# Patient Record
Sex: FEMALE | Race: WHITE | NOT HISPANIC OR LATINO | Employment: OTHER | URBAN - METROPOLITAN AREA
[De-identification: names, ages, dates, MRNs, and addresses within clinical notes are randomized per-mention and may not be internally consistent; named-entity substitution may affect disease eponyms.]

---

## 2020-06-11 ENCOUNTER — TELEPHONE (OUTPATIENT)
Dept: CARDIOLOGY | Facility: MEDICAL CENTER | Age: 70
End: 2020-06-11

## 2020-06-11 NOTE — TELEPHONE ENCOUNTER
Called patient to see if she has followed with a cardiologist in the past to get records prior to new patient appt with [facility]. She states she was seen by Dr. Balderas in the past, faxed STAT records request to his office (F: [phone], P: [phone]). Fax confirmation received.

## 2020-06-12 ENCOUNTER — OFFICE VISIT (OUTPATIENT)
Dept: CARDIOLOGY | Facility: MEDICAL CENTER | Age: 70
End: 2020-06-12
Payer: MEDICARE

## 2020-06-12 VITALS
HEART RATE: 100 BPM | WEIGHT: 199.96 LBS | HEIGHT: 64 IN | BODY MASS INDEX: 34.14 KG/M2 | SYSTOLIC BLOOD PRESSURE: 118 MMHG | RESPIRATION RATE: 14 BRPM | DIASTOLIC BLOOD PRESSURE: 82 MMHG | OXYGEN SATURATION: 95 %

## 2020-06-12 DIAGNOSIS — R94.39 ABNORMAL NUCLEAR STRESS TEST: ICD-10-CM

## 2020-06-12 DIAGNOSIS — E78.5 HYPERLIPIDEMIA, UNSPECIFIED HYPERLIPIDEMIA TYPE: ICD-10-CM

## 2020-06-12 DIAGNOSIS — R09.89 OTHER SPECIFIED SYMPTOMS AND SIGNS INVOLVING THE CIRCULATORY AND RESPIRATORY SYSTEMS: ICD-10-CM

## 2020-06-12 DIAGNOSIS — M79.605 PAIN IN BOTH LOWER EXTREMITIES: ICD-10-CM

## 2020-06-12 DIAGNOSIS — M79.604 PAIN IN BOTH LOWER EXTREMITIES: ICD-10-CM

## 2020-06-12 DIAGNOSIS — Z72.0 TOBACCO ABUSE: ICD-10-CM

## 2020-06-12 DIAGNOSIS — R06.01 ORTHOPNEA: ICD-10-CM

## 2020-06-12 LAB — EKG IMPRESSION: NORMAL

## 2020-06-12 PROCEDURE — 93000 ELECTROCARDIOGRAM COMPLETE: CPT | Performed by: INTERNAL MEDICINE

## 2020-06-12 PROCEDURE — 99203 OFFICE O/P NEW LOW 30 MIN: CPT | Mod: 25 | Performed by: INTERNAL MEDICINE

## 2020-06-12 PROCEDURE — 99407 BEHAV CHNG SMOKING > 10 MIN: CPT | Performed by: INTERNAL MEDICINE

## 2020-06-12 RX ORDER — BUPROPION HYDROCHLORIDE 150 MG/1
TABLET, EXTENDED RELEASE ORAL
Qty: 90 TAB | Refills: 1 | Status: SHIPPED | OUTPATIENT
Start: 2020-06-12 | End: 2020-07-13

## 2020-06-12 RX ORDER — LISINOPRIL 20 MG/1
TABLET ORAL
COMMUNITY
Start: 2020-04-27 | End: 2020-06-12

## 2020-06-12 RX ORDER — CHLORTHALIDONE 25 MG/1
TABLET ORAL
COMMUNITY
Start: 2020-04-27 | End: 2021-05-25 | Stop reason: SDUPTHER

## 2020-06-12 RX ORDER — WARFARIN SODIUM 5 MG/1
TABLET ORAL
COMMUNITY
Start: 2020-04-27 | End: 2021-03-08

## 2020-06-12 RX ORDER — WARFARIN SODIUM 5 MG/1
TABLET ORAL
COMMUNITY
Start: 2020-04-27 | End: 2020-06-12

## 2020-06-12 RX ORDER — LISINOPRIL 20 MG/1
10 TABLET ORAL
COMMUNITY
Start: 2020-04-27 | End: 2021-06-07

## 2020-06-12 RX ORDER — ROSUVASTATIN CALCIUM 5 MG/1
5 TABLET, COATED ORAL
Qty: 30 TAB | Refills: 3 | Status: ON HOLD | OUTPATIENT
Start: 2020-06-12 | End: 2020-06-26 | Stop reason: SDUPTHER

## 2020-06-12 RX ORDER — CHLORTHALIDONE 25 MG/1
TABLET ORAL
COMMUNITY
Start: 2020-04-27 | End: 2020-06-12

## 2020-06-12 ASSESSMENT — ENCOUNTER SYMPTOMS
DIARRHEA: 0
COUGH: 0
DYSPNEA ON EXERTION: 0
SYNCOPE: 0
BACK PAIN: 0
IRREGULAR HEARTBEAT: 0
BLURRED VISION: 0
ABDOMINAL PAIN: 0
FLANK PAIN: 0
HEARTBURN: 0
DECREASED APPETITE: 0
CONSTIPATION: 0
PND: 0
WEIGHT LOSS: 0
VOMITING: 0
ALTERED MENTAL STATUS: 0
SHORTNESS OF BREATH: 0
DEPRESSION: 0
NAUSEA: 0
PALPITATIONS: 0
DIZZINESS: 0
CLAUDICATION: 1
NEAR-SYNCOPE: 0
FEVER: 0
ORTHOPNEA: 1
WEIGHT GAIN: 0

## 2020-06-12 NOTE — PATIENT INSTRUCTIONS
Take cholesterol medication with Co Q10.     Please attempt to quit smoking. Start wellbutrin in combination with nicotine patch (over the counter).    Rosuvastatin Tablets  What is this medicine?  ROSUVASTATIN (hima LI de jesus sta tin) is known as a HMG-CoA reductase inhibitor or 'statin'. It lowers cholesterol and triglycerides in the blood. This drug may also reduce the risk of heart attack, stroke, or other health problems in patients with risk factors for heart disease. Diet and lifestyle changes are often used with this drug.  This medicine may be used for other purposes; ask your health care provider or pharmacist if you have questions.  COMMON BRAND NAME(S): Crestor  What should I tell my health care provider before I take this medicine?  They need to know if you have any of these conditions:  -frequently drink alcoholic beverages  -kidney disease  -liver disease  -muscle aches or weakness  -other medical condition  -an unusual or allergic reaction to rosuvastatin, other medicines, foods, dyes, or preservatives  -pregnant or trying to get pregnant  -breast-feeding  How should I use this medicine?  Take this medicine by mouth with a glass of water. Follow the directions on the prescription label. Do not cut, crush or chew this medicine. You can take this medicine with or without food. Take your doses at regular intervals. Do not take your medicine more often than directed.  Talk to your pediatrician regarding the use of this medicine in children. While this drug may be prescribed for children as young as 7 years old for selected conditions, precautions do apply.  Overdosage: If you think you have taken too much of this medicine contact a poison control center or emergency room at once.  NOTE: This medicine is only for you. Do not share this medicine with others.  What if I miss a dose?  If you miss a dose, take it as soon as you can. Do not take 2 doses within 12 hours of each other. If there are less than 12  hours until your next dose, take only that dose. Do not take double or extra doses.  What may interact with this medicine?  Do not take this medicine with any of the following medications:  -herbal medicines like red yeast rice  This medicine may also interact with the following medications:  -alcohol  -antacids containing aluminum hydroxide or magnesium hydroxide  -cyclosporine  -other medicines for high cholesterol  -some medicines for HIV infection  -warfarin  This list may not describe all possible interactions. Give your health care provider a list of all the medicines, herbs, non-prescription drugs, or dietary supplements you use. Also tell them if you smoke, drink alcohol, or use illegal drugs. Some items may interact with your medicine.  What should I watch for while using this medicine?  Visit your doctor or health care professional for regular check-ups. You may need regular tests to make sure your liver is working properly.  Tell your doctor or health care professional right away if you get any unexplained muscle pain, tenderness, or weakness, especially if you also have a fever and tiredness. Your doctor or health care professional may tell you to stop taking this medicine if you develop muscle problems. If your muscle problems do not go away after stopping this medicine, contact your health care professional.  This medicine may affect blood sugar levels. If you have diabetes, check with your doctor or health care professional before you change your diet or the dose of your diabetic medicine.  Avoid taking antacids containing aluminum, calcium or magnesium within 2 hours of taking this medicine.  This drug is only part of a total heart-health program. Your doctor or a dietician can suggest a low-cholesterol and low-fat diet to help. Avoid alcohol and smoking, and keep a proper exercise schedule.  Do not use this drug if you are pregnant or breast-feeding. Serious side effects to an unborn child or to an  infant are possible. Talk to your doctor or pharmacist for more information.  What side effects may I notice from receiving this medicine?  Side effects that you should report to your doctor or health care professional as soon as possible:  -allergic reactions like skin rash, itching or hives, swelling of the face, lips, or tongue  -dark urine  -fever  -joint pain  -muscle cramps, pain  -redness, blistering, peeling or loosening of the skin, including inside the mouth  -trouble passing urine or change in the amount of urine  -unusually weak or tired  -yellowing of the eyes or skin  Side effects that usually do not require medical attention (report to your doctor or health care professional if they continue or are bothersome):  -constipation  -heartburn  -nausea  -stomach gas, pain, upset  This list may not describe all possible side effects. Call your doctor for medical advice about side effects. You may report side effects to FDA at 0-816-FDA-3297.  Where should I keep my medicine?  Keep out of the reach of children.  Store at room temperature between 20 and 25 degrees C (68 and 77 degrees F). Keep container tightly closed (protect from moisture). Throw away any unused medicine after the expiration date.  NOTE: This sheet is a summary. It may not cover all possible information. If you have questions about this medicine, talk to your doctor, pharmacist, or health care provider.  © 2018 [employer]vier/Gold Standard (2016-06-02 13:33:08)

## 2020-06-12 NOTE — PROGRESS NOTES
"Cardiology Note    Chief Complaint   Patient presents with   • Cardiomegaly     NP       History of Present Illness: Trina Naidu is a 70 y.o. female PMH active smoker, factor V leiden with hx DVT/PE admitted [facility] 2016 who presents for initial visit.    Feels \"heart spasm\" describes as tightness with tachycardia. Improvement with sitting up compared to laying flat. \"I get more short of breath laying down.\" Was given oxygen concentrator at night for orthostasis by former cardiologist Dr Birmingham. Can walk in grocery store but limited due to leg pain; not clear if typical claudication. Continues to smoke but would like to quit.       Review of Systems   Constitution: Negative for decreased appetite, fever, malaise/fatigue, weight gain and weight loss.   HENT: Negative for congestion and nosebleeds.    Eyes: Negative for blurred vision.   Cardiovascular: Positive for claudication and orthopnea. Negative for chest pain, dyspnea on exertion, irregular heartbeat, leg swelling, near-syncope, palpitations, paroxysmal nocturnal dyspnea and syncope.   Respiratory: Negative for cough and shortness of breath.    Endocrine: Negative for cold intolerance and heat intolerance.   Skin: Negative for rash.   Musculoskeletal: Negative for back pain.   Gastrointestinal: Negative for abdominal pain, constipation, diarrhea, heartburn, melena, nausea and vomiting.   Genitourinary: Negative for dysuria, flank pain and hematuria.   Neurological: Negative for dizziness.   Psychiatric/Behavioral: Negative for altered mental status and depression.         No past medical history on file.      No past surgical history on file.      Current Outpatient Medications   Medication Sig Dispense Refill   • chlorthalidone (HYGROTON) 25 MG Tab TAKE 1 TABLET BY MOUTH DAILY     • lisinopril (PRINIVIL) 20 MG Tab 10 mg. TAKE 1 TABLET BY MOUTH DAILY     • warfarin (COUMADIN) 5 MG Tab TAKE 1 TABLET BY MOUTH DAILY     • rosuvastatin (CRESTOR) 5 MG Tab Take 1 " "Tab by mouth 3 times a week. 30 Tab 3   • buPROPion SR (WELLBUTRIN SR) 150 MG TABLET SR 12 HR sustained-release tablet For three days take one tab per day and the one tab twice per day; 12 weeks. 90 Tab 1     No current facility-administered medications for this visit.          No Known Allergies      No family history on file.      Social History     Socioeconomic History   • Marital status: Single     Spouse name: Not on file   • Number of children: Not on file   • Years of education: Not on file   • Highest education level: Not on file   Occupational History   • Not on file   Social Needs   • Financial resource strain: Not on file   • Food insecurity     Worry: Not on file     Inability: Not on file   • Transportation needs     Medical: Not on file     Non-medical: Not on file   Tobacco Use   • Smoking status: Current Every Day Smoker     Types: Cigarettes   • Smokeless tobacco: Never Used   Substance and Sexual Activity   • Alcohol use: Not on file   • Drug use: Not on file   • Sexual activity: Not on file   Lifestyle   • Physical activity     Days per week: Not on file     Minutes per session: Not on file   • Stress: Not on file   Relationships   • Social connections     Talks on phone: Not on file     Gets together: Not on file     Attends [employer] service: Not on file     Active member of club or organization: Not on file     Attends meetings of clubs or organizations: Not on file     Relationship status: Not on file   • Intimate partner violence     Fear of current or ex partner: Not on file     Emotionally abused: Not on file     Physically abused: Not on file     Forced sexual activity: Not on file   Other Topics Concern   • Not on file   Social History Narrative   • Not on file         Physical Exam:  Ambulatory Vitals  /82 (BP Location: Left arm, Patient Position: Sitting, BP Cuff Size: Adult)   Pulse 100   Resp 14   Ht 1.613 m (5' 3.5\")   Wt 90.7 kg (199 lb 15.3 oz)   SpO2 95%    BP Readings " "from Last 4 Encounters:   06/12/20 118/82     Weight/BMI:   Vitals:    06/12/20 1036   BP: 118/82   Weight: 90.7 kg (199 lb 15.3 oz)   Height: 1.613 m (5' 3.5\")    Body mass index is 34.87 kg/m².  Wt Readings from Last 4 Encounters:   06/12/20 90.7 kg (199 lb 15.3 oz)       Physical Exam   Constitutional: She is oriented to person, place, and time and well-developed, well-nourished, and in no distress. No distress.   HENT:   Head: Normocephalic and atraumatic.   Eyes: Pupils are equal, round, and reactive to light. Conjunctivae are normal.   Neck: Normal range of motion. Neck supple. No JVD present.   Cardiovascular: Normal rate, regular rhythm, normal heart sounds and intact distal pulses. Exam reveals no gallop and no friction rub.   No murmur heard.  Pulmonary/Chest: Effort normal and breath sounds normal. No respiratory distress. She has no wheezes. She has no rales. She exhibits no tenderness.   Abdominal: Soft. Bowel sounds are normal. She exhibits no distension.   Musculoskeletal:         General: No edema.   Neurological: She is alert and oriented to person, place, and time.   Skin: Skin is warm and dry.   Psychiatric: Affect and judgment normal.       Lab Data Review:  No results found for: CHOLSTRLTOT, LDL, HDL, TRIGLYCERIDE    No results found for: SODIUM, POTASSIUM, CHLORIDE, CO2, GLUCOSE, BUN, CREATININE, BUNCREATRAT, GLOMRATE  CrCl cannot be calculated (No successful lab value found.).  No results found for: ALKPHOSPHAT, ASTSGOT, ALTSGPT, TBILIRUBIN   No results found for: WBC, HCT  No results found for: HBA1C  No components found for: TROP    Outside labs [facility] [place] 2017  , trig 180, HDL 36, tot chol 273    Cardiac Imaging and Procedures Review:      TTE 2/6/2018 w Dr Birmingham outside study  -normal LV size, inferior base mild hypokinesis, normal systolic function, mild concentric LVH  -mild LAE  -mild MR  -mild RV and RA enlargement and preserved RV function    Nuclear stress spect exercise " "2/6/2018 w Dr Birmingham outside study  -duration 3 min reaching MPHR 85%  -\"mild to moderate amount of partial to complete fixed defects of all segments base to apex (with some GI), lynn mild to moderate amount of partial to complete reversible ischemia of anteroseptal, septal, and inferoseptal segments at mid-ventricle and anterolateral base, with some mild partial to complete reversed redistribution of same segments consistent with some oft tissue attenuation; cannot rule out for prior event(s)\" ... ???    Medical Decision Making:  Problem List Items Addressed This Visit     Tobacco abuse    Hyperlipidemia    Relevant Medications    chlorthalidone (HYGROTON) 25 MG Tab    lisinopril (PRINIVIL) 20 MG Tab    warfarin (COUMADIN) 5 MG Tab    rosuvastatin (CRESTOR) 5 MG Tab    Other Relevant Orders    REFERRAL TO NUTRITION SERVICES    Abnormal nuclear stress test    Relevant Orders    CL-RIGHT AND LEFT HEART CATHETERIZATION    Orthopnea    Relevant Orders    CL-RIGHT AND LEFT HEART CATHETERIZATION    Pain in both lower extremities    Relevant Orders    US-EXTREMITY ARTERY LOWER BILAT W/JUAN F (COMBO)    Other specified symptoms and signs involving the circulatory and respiratory systems     Relevant Orders    US-EXTREMITY ARTERY LOWER BILAT W/JUAN F (COMBO)        Orthopnea and abnormal stress - definitive testing with R/LHC    LE pain / possibly claudication - check LE JUAN F    HTN - controlled. Continue regimen.    HLD (LDL >190) - attempt rosuvastatin 5mg three times weekly. Previously myalgia with atorvastatin.     Tobacco abuse -  Discussed for 15 min. She will attempt wellbutrin and nicotine patch to quit.    It was my pleasure to meet with Ms. Naidu.                      "

## 2020-06-17 ENCOUNTER — TELEPHONE (OUTPATIENT)
Dept: CARDIOLOGY | Facility: MEDICAL CENTER | Age: 70
End: 2020-06-17

## 2020-06-17 NOTE — TELEPHONE ENCOUNTER
"Per VR instructions from 6/12: \"Take cholesterol medication with Co Q10.\" Returned pt's call and advised that she should take at the same time as rosuvastatin.   "

## 2020-06-17 NOTE — TELEPHONE ENCOUNTER
MARÍA ELENA/roxanne    Pt calling for clarification of VR's instructions on taking her rosuvastatin along with the timing of taking her CO-Q-10.    Please call Trina

## 2020-06-18 ENCOUNTER — TELEPHONE (OUTPATIENT)
Dept: CARDIOLOGY | Facility: MEDICAL CENTER | Age: 70
End: 2020-06-18

## 2020-06-18 NOTE — TELEPHONE ENCOUNTER
Called patient 2xs on only phone number on file.  No voicemail set up.  PT needs to r/s R&LHC set up for 6/22/2020 to another day.

## 2020-06-19 ENCOUNTER — TELEPHONE (OUTPATIENT)
Dept: CARDIOLOGY | Facility: MEDICAL CENTER | Age: 70
End: 2020-06-19

## 2020-06-19 NOTE — TELEPHONE ENCOUNTER
Patient is scheduled on 6/25/2020 for a R& LHC w/poss with Dr. Saavedra.  PT to check in at 8:30am for a 10:30am procedure.  Patient instructed to stop coumadin for 5 days prior to procedure. Pt to contact [facility] who manages her coumadin. Updated H & P done by VR on 6/12/2020.  PRe-admit to call pt.

## 2020-06-24 SDOH — HEALTH STABILITY: MENTAL HEALTH: HOW OFTEN DO YOU HAVE A DRINK CONTAINING ALCOHOL?: NEVER

## 2020-06-24 NOTE — OR NURSING
COVID-19 Pre-Surgery Screenin. Do you have an undiagnosed respiratory illness or symptoms such as coughing or sneezing? no     1. Onset of Sx: na     2. Acute vs. chronic respiratory illness: na     2. Do you have an unexplained fever greater than 100.4 degrees Fahrenheit or 38 degrees Celsius? no  ?  3. Have you had direct exposure to a patient who tested positive for Covid-19? no    4. Have you traveled outside of [place] within the last 14 days? no    5. Patient informed of current visitation and mask policies by this RN.

## 2020-06-25 ENCOUNTER — HOSPITAL ENCOUNTER (OUTPATIENT)
Facility: MEDICAL CENTER | Age: 70
End: 2020-06-26
Attending: INTERNAL MEDICINE | Admitting: INTERNAL MEDICINE
Payer: MEDICARE

## 2020-06-25 ENCOUNTER — APPOINTMENT (OUTPATIENT)
Dept: CARDIOLOGY | Facility: MEDICAL CENTER | Age: 70
End: 2020-06-25
Attending: INTERNAL MEDICINE
Payer: MEDICARE

## 2020-06-25 DIAGNOSIS — R06.01 ORTHOPNEA: ICD-10-CM

## 2020-06-25 DIAGNOSIS — R94.39 ABNORMAL NUCLEAR STRESS TEST: ICD-10-CM

## 2020-06-25 DIAGNOSIS — D68.2 FACTOR V DEFICIENCY (HCC): ICD-10-CM

## 2020-06-25 LAB
25(OH)D3 SERPL-MCNC: 35 NG/ML (ref 30–100)
ALBUMIN SERPL BCP-MCNC: 3.9 G/DL (ref 3.2–4.9)
ALBUMIN/GLOB SERPL: 1.3 G/DL
ALP SERPL-CCNC: 73 U/L (ref 30–99)
ALT SERPL-CCNC: 20 U/L (ref 2–50)
ANION GAP SERPL CALC-SCNC: 10 MMOL/L (ref 7–16)
APTT PPP: 33.1 SEC (ref 24.7–36)
AST SERPL-CCNC: 15 U/L (ref 12–45)
BILIRUB SERPL-MCNC: 0.4 MG/DL (ref 0.1–1.5)
BUN SERPL-MCNC: 26 MG/DL (ref 8–22)
CALCIUM SERPL-MCNC: 10.3 MG/DL (ref 8.5–10.5)
CHLORIDE SERPL-SCNC: 98 MMOL/L (ref 96–112)
CHOLEST SERPL-MCNC: 179 MG/DL (ref 100–199)
CO2 SERPL-SCNC: 23 MMOL/L (ref 20–33)
CREAT SERPL-MCNC: 0.73 MG/DL (ref 0.5–1.4)
EKG IMPRESSION: NORMAL
EKG IMPRESSION: NORMAL
ERYTHROCYTE [DISTWIDTH] IN BLOOD BY AUTOMATED COUNT: 49.1 FL (ref 35.9–50)
EST. AVERAGE GLUCOSE BLD GHB EST-MCNC: 120 MG/DL
GLOBULIN SER CALC-MCNC: 3 G/DL (ref 1.9–3.5)
GLUCOSE SERPL-MCNC: 131 MG/DL (ref 65–99)
HBA1C MFR BLD: 5.8 % (ref 0–5.6)
HCT VFR BLD AUTO: 49.6 % (ref 37–47)
HDLC SERPL-MCNC: 42 MG/DL
HGB BLD-MCNC: 16.9 G/DL (ref 12–16)
INR PPP: 0.96 (ref 0.87–1.13)
IRON SATN MFR SERPL: 21 % (ref 15–55)
IRON SERPL-MCNC: 64 UG/DL (ref 40–170)
LDLC SERPL CALC-MCNC: 116 MG/DL
MCH RBC QN AUTO: 31.7 PG (ref 27–33)
MCHC RBC AUTO-ENTMCNC: 34.1 G/DL (ref 33.6–35)
MCV RBC AUTO: 93.1 FL (ref 81.4–97.8)
PLATELET # BLD AUTO: 190 K/UL (ref 164–446)
PMV BLD AUTO: 12.3 FL (ref 9–12.9)
POTASSIUM SERPL-SCNC: 3.7 MMOL/L (ref 3.6–5.5)
PROT SERPL-MCNC: 6.9 G/DL (ref 6–8.2)
PROTHROMBIN TIME: 13.1 SEC (ref 12–14.6)
RBC # BLD AUTO: 5.33 M/UL (ref 4.2–5.4)
SODIUM SERPL-SCNC: 131 MMOL/L (ref 135–145)
T3FREE SERPL-MCNC: 3.27 PG/ML (ref 2–4.4)
T4 FREE SERPL-MCNC: 1.27 NG/DL (ref 0.93–1.7)
TIBC SERPL-MCNC: 312 UG/DL (ref 250–450)
TRIGL SERPL-MCNC: 106 MG/DL (ref 0–149)
TSH SERPL DL<=0.005 MIU/L-ACNC: 2.23 UIU/ML (ref 0.38–5.33)
UIBC SERPL-MCNC: 248 UG/DL (ref 110–370)
WBC # BLD AUTO: 9.5 K/UL (ref 4.8–10.8)

## 2020-06-25 PROCEDURE — 84443 ASSAY THYROID STIM HORMONE: CPT

## 2020-06-25 PROCEDURE — 93005 ELECTROCARDIOGRAM TRACING: CPT | Performed by: INTERNAL MEDICINE

## 2020-06-25 PROCEDURE — 85027 COMPLETE CBC AUTOMATED: CPT

## 2020-06-25 PROCEDURE — 700105 HCHG RX REV CODE 258: Performed by: INTERNAL MEDICINE

## 2020-06-25 PROCEDURE — 700102 HCHG RX REV CODE 250 W/ 637 OVERRIDE(OP): Performed by: NURSE PRACTITIONER

## 2020-06-25 PROCEDURE — 82306 VITAMIN D 25 HYDROXY: CPT

## 2020-06-25 PROCEDURE — 84481 FREE ASSAY (FT-3): CPT

## 2020-06-25 PROCEDURE — A9270 NON-COVERED ITEM OR SERVICE: HCPCS | Performed by: NURSE PRACTITIONER

## 2020-06-25 PROCEDURE — 92928 PRQ TCAT PLMT NTRAC ST 1 LES: CPT | Mod: LC | Performed by: INTERNAL MEDICINE

## 2020-06-25 PROCEDURE — 93010 ELECTROCARDIOGRAM REPORT: CPT | Mod: 76 | Performed by: INTERNAL MEDICINE

## 2020-06-25 PROCEDURE — 700101 HCHG RX REV CODE 250

## 2020-06-25 PROCEDURE — 83550 IRON BINDING TEST: CPT

## 2020-06-25 PROCEDURE — C1887 CATHETER, GUIDING: HCPCS

## 2020-06-25 PROCEDURE — 160002 HCHG RECOVERY MINUTES (STAT)

## 2020-06-25 PROCEDURE — 700111 HCHG RX REV CODE 636 W/ 250 OVERRIDE (IP)

## 2020-06-25 PROCEDURE — 83540 ASSAY OF IRON: CPT

## 2020-06-25 PROCEDURE — 80053 COMPREHEN METABOLIC PANEL: CPT

## 2020-06-25 PROCEDURE — 99152 MOD SED SAME PHYS/QHP 5/>YRS: CPT | Performed by: INTERNAL MEDICINE

## 2020-06-25 PROCEDURE — A9270 NON-COVERED ITEM OR SERVICE: HCPCS

## 2020-06-25 PROCEDURE — 93458 L HRT ARTERY/VENTRICLE ANGIO: CPT | Mod: 26,59 | Performed by: INTERNAL MEDICINE

## 2020-06-25 PROCEDURE — 84439 ASSAY OF FREE THYROXINE: CPT

## 2020-06-25 PROCEDURE — 700102 HCHG RX REV CODE 250 W/ 637 OVERRIDE(OP)

## 2020-06-25 PROCEDURE — 80061 LIPID PANEL: CPT

## 2020-06-25 PROCEDURE — G0378 HOSPITAL OBSERVATION PER HR: HCPCS

## 2020-06-25 PROCEDURE — 93010 ELECTROCARDIOGRAM REPORT: CPT | Performed by: INTERNAL MEDICINE

## 2020-06-25 PROCEDURE — 83036 HEMOGLOBIN GLYCOSYLATED A1C: CPT

## 2020-06-25 PROCEDURE — 85610 PROTHROMBIN TIME: CPT

## 2020-06-25 PROCEDURE — 85730 THROMBOPLASTIN TIME PARTIAL: CPT

## 2020-06-25 RX ORDER — PRASUGREL 10 MG/1
TABLET, FILM COATED ORAL
Status: COMPLETED
Start: 2020-06-25 | End: 2020-06-25

## 2020-06-25 RX ORDER — WARFARIN SODIUM 5 MG/1
5 TABLET ORAL DAILY
Status: DISCONTINUED | OUTPATIENT
Start: 2020-06-25 | End: 2020-06-26 | Stop reason: HOSPADM

## 2020-06-25 RX ORDER — BIVALIRUDIN 250 MG/5ML
INJECTION, POWDER, LYOPHILIZED, FOR SOLUTION INTRAVENOUS
Status: COMPLETED
Start: 2020-06-25 | End: 2020-06-25

## 2020-06-25 RX ORDER — HEPARIN SODIUM,PORCINE 1000/ML
VIAL (ML) INJECTION
Status: COMPLETED
Start: 2020-06-25 | End: 2020-06-25

## 2020-06-25 RX ORDER — POLYETHYLENE GLYCOL 3350 17 G/17G
1 POWDER, FOR SOLUTION ORAL
Status: DISCONTINUED | OUTPATIENT
Start: 2020-06-25 | End: 2020-06-26 | Stop reason: HOSPADM

## 2020-06-25 RX ORDER — CHLORTHALIDONE 25 MG/1
25 TABLET ORAL
Status: DISCONTINUED | OUTPATIENT
Start: 2020-06-25 | End: 2020-06-26 | Stop reason: HOSPADM

## 2020-06-25 RX ORDER — VERAPAMIL HYDROCHLORIDE 2.5 MG/ML
INJECTION, SOLUTION INTRAVENOUS
Status: COMPLETED
Start: 2020-06-25 | End: 2020-06-25

## 2020-06-25 RX ORDER — LIDOCAINE HYDROCHLORIDE 20 MG/ML
INJECTION, SOLUTION INFILTRATION; PERINEURAL
Status: COMPLETED
Start: 2020-06-25 | End: 2020-06-25

## 2020-06-25 RX ORDER — NITROGLYCERIN 0.4 MG/1
TABLET SUBLINGUAL
Status: DISPENSED
Start: 2020-06-25 | End: 2020-06-26

## 2020-06-25 RX ORDER — LISINOPRIL 10 MG/1
10 TABLET ORAL
Status: DISCONTINUED | OUTPATIENT
Start: 2020-06-25 | End: 2020-06-26 | Stop reason: HOSPADM

## 2020-06-25 RX ORDER — ONDANSETRON 4 MG/1
4 TABLET, ORALLY DISINTEGRATING ORAL EVERY 4 HOURS PRN
Status: DISCONTINUED | OUTPATIENT
Start: 2020-06-25 | End: 2020-06-26 | Stop reason: HOSPADM

## 2020-06-25 RX ORDER — MIDAZOLAM HYDROCHLORIDE 1 MG/ML
INJECTION INTRAMUSCULAR; INTRAVENOUS
Status: COMPLETED
Start: 2020-06-25 | End: 2020-06-25

## 2020-06-25 RX ORDER — HEPARIN SODIUM 200 [USP'U]/100ML
INJECTION, SOLUTION INTRAVENOUS
Status: COMPLETED
Start: 2020-06-25 | End: 2020-06-25

## 2020-06-25 RX ORDER — SODIUM CHLORIDE 9 MG/ML
INJECTION, SOLUTION INTRAVENOUS CONTINUOUS
Status: DISCONTINUED | OUTPATIENT
Start: 2020-06-25 | End: 2020-06-25

## 2020-06-25 RX ORDER — ENALAPRILAT 1.25 MG/ML
1.25 INJECTION INTRAVENOUS EVERY 6 HOURS PRN
Status: DISCONTINUED | OUTPATIENT
Start: 2020-06-25 | End: 2020-06-26 | Stop reason: HOSPADM

## 2020-06-25 RX ORDER — ROSUVASTATIN CALCIUM 5 MG/1
5 TABLET, COATED ORAL
Status: DISCONTINUED | OUTPATIENT
Start: 2020-06-26 | End: 2020-06-26 | Stop reason: HOSPADM

## 2020-06-25 RX ORDER — ACETAMINOPHEN 325 MG/1
650 TABLET ORAL EVERY 6 HOURS PRN
Status: DISCONTINUED | OUTPATIENT
Start: 2020-06-25 | End: 2020-06-26 | Stop reason: HOSPADM

## 2020-06-25 RX ORDER — ACETAMINOPHEN 325 MG/1
650 TABLET ORAL EVERY 6 HOURS PRN
Status: DISCONTINUED | OUTPATIENT
Start: 2020-06-25 | End: 2020-06-25

## 2020-06-25 RX ORDER — SODIUM CHLORIDE 9 MG/ML
INJECTION, SOLUTION INTRAVENOUS CONTINUOUS
Status: ACTIVE | OUTPATIENT
Start: 2020-06-25 | End: 2020-06-25

## 2020-06-25 RX ORDER — PRASUGREL 10 MG/1
60 TABLET, FILM COATED ORAL ONCE
Status: DISCONTINUED | OUTPATIENT
Start: 2020-06-25 | End: 2020-06-25

## 2020-06-25 RX ORDER — AMOXICILLIN 250 MG
2 CAPSULE ORAL 2 TIMES DAILY
Status: DISCONTINUED | OUTPATIENT
Start: 2020-06-25 | End: 2020-06-26 | Stop reason: HOSPADM

## 2020-06-25 RX ORDER — PRASUGREL 10 MG/1
10 TABLET, FILM COATED ORAL DAILY
Qty: 30 TAB | Refills: 11 | Status: SHIPPED | OUTPATIENT
Start: 2020-06-26 | End: 2020-06-26

## 2020-06-25 RX ORDER — PRASUGREL 10 MG/1
10 TABLET, FILM COATED ORAL DAILY
Status: DISCONTINUED | OUTPATIENT
Start: 2020-06-26 | End: 2020-06-26 | Stop reason: HOSPADM

## 2020-06-25 RX ORDER — ASPIRIN 81 MG/1
TABLET, CHEWABLE ORAL
Status: COMPLETED
Start: 2020-06-25 | End: 2020-06-25

## 2020-06-25 RX ORDER — ONDANSETRON 2 MG/ML
4 INJECTION INTRAMUSCULAR; INTRAVENOUS EVERY 4 HOURS PRN
Status: DISCONTINUED | OUTPATIENT
Start: 2020-06-25 | End: 2020-06-26 | Stop reason: HOSPADM

## 2020-06-25 RX ORDER — BISACODYL 10 MG
10 SUPPOSITORY, RECTAL RECTAL
Status: DISCONTINUED | OUTPATIENT
Start: 2020-06-25 | End: 2020-06-26 | Stop reason: HOSPADM

## 2020-06-25 RX ORDER — BUPROPION HYDROCHLORIDE 150 MG/1
150 TABLET, EXTENDED RELEASE ORAL DAILY
Status: DISCONTINUED | OUTPATIENT
Start: 2020-06-25 | End: 2020-06-25

## 2020-06-25 RX ADMIN — VERAPAMIL HYDROCHLORIDE 2.5 MG: 5 INJECTION INTRAVENOUS at 11:46

## 2020-06-25 RX ADMIN — ASPIRIN 81 MG 81 MG: 81 TABLET ORAL at 12:04

## 2020-06-25 RX ADMIN — LIDOCAINE HYDROCHLORIDE: 20 INJECTION, SOLUTION INFILTRATION; PERINEURAL at 11:46

## 2020-06-25 RX ADMIN — SODIUM CHLORIDE: 9 INJECTION, SOLUTION INTRAVENOUS at 12:30

## 2020-06-25 RX ADMIN — SODIUM CHLORIDE: 9 INJECTION, SOLUTION INTRAVENOUS at 09:05

## 2020-06-25 RX ADMIN — CHLORTHALIDONE 25 MG: 25 TABLET ORAL at 20:34

## 2020-06-25 RX ADMIN — WARFARIN SODIUM 5 MG: 5 TABLET ORAL at 18:09

## 2020-06-25 RX ADMIN — BIVALIRUDIN 0.2 MG/KG/HR: 250 INJECTION, POWDER, LYOPHILIZED, FOR SOLUTION INTRAVENOUS at 12:15

## 2020-06-25 RX ADMIN — HEPARIN SODIUM 2000 UNITS: 200 INJECTION, SOLUTION INTRAVENOUS at 11:47

## 2020-06-25 RX ADMIN — LISINOPRIL 10 MG: 10 TABLET ORAL at 20:34

## 2020-06-25 RX ADMIN — FENTANYL CITRATE 100 MCG: 50 INJECTION INTRAMUSCULAR; INTRAVENOUS at 11:54

## 2020-06-25 RX ADMIN — MIDAZOLAM HYDROCHLORIDE 2 MG: 1 INJECTION, SOLUTION INTRAMUSCULAR; INTRAVENOUS at 11:54

## 2020-06-25 RX ADMIN — NITROGLYCERIN 10 ML: 20 INJECTION INTRAVENOUS at 11:47

## 2020-06-25 RX ADMIN — HEPARIN SODIUM: 1000 INJECTION, SOLUTION INTRAVENOUS; SUBCUTANEOUS at 11:46

## 2020-06-25 RX ADMIN — PRASUGREL 60 MG: 10 TABLET, FILM COATED ORAL at 12:14

## 2020-06-25 ASSESSMENT — LIFESTYLE VARIABLES
EVER FELT BAD OR GUILTY ABOUT YOUR DRINKING: NO
EVER HAD A DRINK FIRST THING IN THE MORNING TO STEADY YOUR NERVES TO GET RID OF A HANGOVER: NO
EVER_SMOKED: YES
TOTAL SCORE: 0
TOTAL SCORE: 0
CONSUMPTION TOTAL: NEGATIVE
ALCOHOL_USE: NO
HOW MANY TIMES IN THE PAST YEAR HAVE YOU HAD 5 OR MORE DRINKS IN A DAY: 0
HAVE YOU EVER FELT YOU SHOULD CUT DOWN ON YOUR DRINKING: NO
TOTAL SCORE: 0
AVERAGE NUMBER OF DAYS PER WEEK YOU HAVE A DRINK CONTAINING ALCOHOL: 0
HAVE PEOPLE ANNOYED YOU BY CRITICIZING YOUR DRINKING: NO
ON A TYPICAL DAY WHEN YOU DRINK ALCOHOL HOW MANY DRINKS DO YOU HAVE: 0

## 2020-06-25 ASSESSMENT — COGNITIVE AND FUNCTIONAL STATUS - GENERAL
MOBILITY SCORE: 24
DAILY ACTIVITIY SCORE: 24
SUGGESTED CMS G CODE MODIFIER MOBILITY: CH
SUGGESTED CMS G CODE MODIFIER DAILY ACTIVITY: CH

## 2020-06-25 ASSESSMENT — PATIENT HEALTH QUESTIONNAIRE - PHQ9
SUM OF ALL RESPONSES TO PHQ9 QUESTIONS 1 AND 2: 0
1. LITTLE INTEREST OR PLEASURE IN DOING THINGS: NOT AT ALL
2. FEELING DOWN, DEPRESSED, IRRITABLE, OR HOPELESS: NOT AT ALL

## 2020-06-25 ASSESSMENT — FIBROSIS 4 INDEX
FIB4 SCORE: 1.2
FIB4 SCORE: 1.2

## 2020-06-25 NOTE — PROCEDURES
REFERRING PHYSICIAN: Dr. Méndez    PREOPERATIVE DIAGNOSIS:  1.  Dyspnea on exertion  2.  Indeterminate stress testing, outside study  3.  Dyspnea on exertion  4.  Tobacco abuse  5.  Morbid obesity  6.  Hypertension    POSTOPERATIVE DIAGNOSIS:  1.  90% focal mid LCx stenosis  2.  Mild nonobstructive CAD otherwise  3.  Normal LVEF and LVEDP  4.  Successful PCI LCx (3.0 x 12 mm Lul PAIGE), excellent result      PROCEDURE PERFORMED:  Selective coronary angiography of the native vessels  Left heart catheterization  Left ventriculogram  PCI of LCx PAIGE  Supervision moderate sedation    DESCRIPTION OF PROCEDURE:  The risks and benefits of cardiac catheterization as well as the procedure itself, rationale and appropriateness were discussed with the patient today. Complications including but not limited to death, stroke, MI, urgent bypass surgery, contrast nephropathy, vascular complications, bleeding and infection were explained to the patient. The potential outcomes associated with the procedure (possible PCI, possible CABG, possible medical Rx only) were also discussed at length. The patient agreed to proceed.    The patient was transported to the catheterization laboratory in the fasting state. The right radial area and right groin were prepped and draped in the usual fashion. Right radial area was entered with a single through and through puncture under direct ultrasound guidance and a 6F glide sheath was placed. An intra-arterial cocktail of heparin, verapamil and nitroglycerine was administered. Over a wire, a 5F Kathie catheter and 4 Slovenian 3 DRC was passed to the aortic root and used to engage the right and left coronaries without difficulty. Contrast was administered and multiple images obtained.  The James catheter was then used to cross the aortic valve for LHC and contrast was administered at 8cc/s for 24cc's for left ventriculogram.     DESCRIPTION OF PCI:  The decision was made to intervene on the culprit  coronary artery.  Bivalirudin bolus and infusion was initiated.  The diagnostic catheter was exchanged over a wire for an 6 Azerbaijani EBU 3.5 guide seated appropriately. A BMW 0.014 floppy tip wire was navigated across the culprit stenosis. A 2.5 mm balloon was used to predilate the lesion. A 3.0 x 15 mm PAIGE was then positioned and deployed at nominal pressure. Following this the stenting balloon was used to post dilate the stent. There was an excellent angiographic result with LOYDA-3 flow and no residual stenosis in the stented segment. All catheters and guidewires were removed and a TR band was applied to achieve patent hemostasis. Patient left the cath lab in stable condition.      Moderate sedation directly monitored by me during the case while supervising the administration of the sedation medication by an independent trained RN to assist in the monitoring of the patient's level of consciousness and physiological status. I, the supervising physician was present the entire time from beginning of medication administration until the end of the procedure from 11:41 AM until 12:11 PM. For detailed administration records please see the moderate sedation documentation in the median tab.      FINDINGS:  I. HEMODYNAMICS:    Ao: 74/57 mmHg   LEDP: 12 mmHg   Gradient on LV pullback: No    II. LEFT VENTRICULOGRAM:   LVEF STANTON PROJECTION: 60 %     III. CORONARY ANGIOGRAPHY:  Left Main: Moderate caliber bifurcating mild nonobstructive CAD.  Left Anterior Descending: Large caliber usual common of diagonals mild nonobstructive CAD.  Left Circumflex: Large and dominant supplying a moderate sized first obtuse marginal.  Just after the takeoff of the OM there is a 90% eccentric focal stenosis which is noncalcified in the circumflex proper.  Post PCI 0% residual stenosis and LOYDA-3 flow.  Right Coronary: Moderate size nondominant vessel with a 40% stenosis in its proximal to midportion which is nonobstructive.    COMPLICATIONS: none  apparent    CONCLUSIONS:  1.  90% focal mid LCx stenosis  2.  Mild nonobstructive CAD otherwise  3.  Normal LVEF and LVEDP  4.  Successful PCI LCx (3.0 x 12 mm Lul PAIGE), excellent result

## 2020-06-25 NOTE — OR NURSING
1230 Patient arrived to unit, awake and alert.  Right radial TR band clean, dry and soft.  Patient denies pain at this time.  Updated on plan of care, verbalized understanding.  1300 Patient called her son and updated him.  1330 Patient up to bathroom, tolerated well.  Back to [facility].  Access site clean, dry and soft.  1400 Care of patient transferred to Elaina ANN.

## 2020-06-26 VITALS
HEIGHT: 63 IN | HEART RATE: 78 BPM | BODY MASS INDEX: 36.29 KG/M2 | DIASTOLIC BLOOD PRESSURE: 91 MMHG | SYSTOLIC BLOOD PRESSURE: 132 MMHG | WEIGHT: 204.81 LBS | TEMPERATURE: 97.2 F | OXYGEN SATURATION: 94 % | RESPIRATION RATE: 16 BRPM

## 2020-06-26 PROBLEM — Z95.5 S/P CORONARY ARTERY STENT PLACEMENT: Status: ACTIVE | Noted: 2020-06-26

## 2020-06-26 PROBLEM — D68.2 FACTOR V DEFICIENCY (HCC): Status: ACTIVE | Noted: 2020-06-26

## 2020-06-26 LAB
ANION GAP SERPL CALC-SCNC: 13 MMOL/L (ref 7–16)
BASOPHILS # BLD AUTO: 0.6 % (ref 0–1.8)
BASOPHILS # BLD: 0.06 K/UL (ref 0–0.12)
BUN SERPL-MCNC: 22 MG/DL (ref 8–22)
CALCIUM SERPL-MCNC: 9.9 MG/DL (ref 8.5–10.5)
CHLORIDE SERPL-SCNC: 104 MMOL/L (ref 96–112)
CO2 SERPL-SCNC: 21 MMOL/L (ref 20–33)
CREAT SERPL-MCNC: 0.64 MG/DL (ref 0.5–1.4)
EOSINOPHIL # BLD AUTO: 0.45 K/UL (ref 0–0.51)
EOSINOPHIL NFR BLD: 4.8 % (ref 0–6.9)
ERYTHROCYTE [DISTWIDTH] IN BLOOD BY AUTOMATED COUNT: 47.2 FL (ref 35.9–50)
GLUCOSE SERPL-MCNC: 112 MG/DL (ref 65–99)
HCT VFR BLD AUTO: 43.6 % (ref 37–47)
HGB BLD-MCNC: 15.1 G/DL (ref 12–16)
IMM GRANULOCYTES # BLD AUTO: 0.03 K/UL (ref 0–0.11)
IMM GRANULOCYTES NFR BLD AUTO: 0.3 % (ref 0–0.9)
LYMPHOCYTES # BLD AUTO: 2.57 K/UL (ref 1–4.8)
LYMPHOCYTES NFR BLD: 27.4 % (ref 22–41)
MCH RBC QN AUTO: 31.5 PG (ref 27–33)
MCHC RBC AUTO-ENTMCNC: 34.6 G/DL (ref 33.6–35)
MCV RBC AUTO: 91 FL (ref 81.4–97.8)
MONOCYTES # BLD AUTO: 0.54 K/UL (ref 0–0.85)
MONOCYTES NFR BLD AUTO: 5.8 % (ref 0–13.4)
NEUTROPHILS # BLD AUTO: 5.73 K/UL (ref 2–7.15)
NEUTROPHILS NFR BLD: 61.1 % (ref 44–72)
NRBC # BLD AUTO: 0 K/UL
NRBC BLD-RTO: 0 /100 WBC
PLATELET # BLD AUTO: 170 K/UL (ref 164–446)
PMV BLD AUTO: 12 FL (ref 9–12.9)
POTASSIUM SERPL-SCNC: 3.6 MMOL/L (ref 3.6–5.5)
RBC # BLD AUTO: 4.79 M/UL (ref 4.2–5.4)
SODIUM SERPL-SCNC: 138 MMOL/L (ref 135–145)
WBC # BLD AUTO: 9.4 K/UL (ref 4.8–10.8)

## 2020-06-26 PROCEDURE — G0378 HOSPITAL OBSERVATION PER HR: HCPCS

## 2020-06-26 PROCEDURE — 700102 HCHG RX REV CODE 250 W/ 637 OVERRIDE(OP): Performed by: NURSE PRACTITIONER

## 2020-06-26 PROCEDURE — A9270 NON-COVERED ITEM OR SERVICE: HCPCS | Performed by: NURSE PRACTITIONER

## 2020-06-26 PROCEDURE — 36415 COLL VENOUS BLD VENIPUNCTURE: CPT

## 2020-06-26 PROCEDURE — 99217 PR OBSERVATION CARE DISCHARGE: CPT | Performed by: NURSE PRACTITIONER

## 2020-06-26 PROCEDURE — 80048 BASIC METABOLIC PNL TOTAL CA: CPT

## 2020-06-26 PROCEDURE — 97161 PT EVAL LOW COMPLEX 20 MIN: CPT

## 2020-06-26 PROCEDURE — 85025 COMPLETE CBC W/AUTO DIFF WBC: CPT

## 2020-06-26 PROCEDURE — 99217 PR OBSERVATION CARE DISCHARGE: CPT | Performed by: INTERNAL MEDICINE

## 2020-06-26 PROCEDURE — 700102 HCHG RX REV CODE 250 W/ 637 OVERRIDE(OP): Performed by: INTERNAL MEDICINE

## 2020-06-26 PROCEDURE — A9270 NON-COVERED ITEM OR SERVICE: HCPCS | Performed by: INTERNAL MEDICINE

## 2020-06-26 RX ORDER — METOPROLOL SUCCINATE 25 MG/1
12.5 TABLET, EXTENDED RELEASE ORAL DAILY
Qty: 30 TAB | Refills: 11 | Status: SHIPPED | OUTPATIENT
Start: 2020-06-26 | End: 2020-07-13 | Stop reason: SDUPTHER

## 2020-06-26 RX ORDER — ROSUVASTATIN CALCIUM 5 MG/1
5 TABLET, COATED ORAL DAILY
Qty: 30 TAB | Refills: 11 | Status: SHIPPED | OUTPATIENT
Start: 2020-06-26 | End: 2020-10-26

## 2020-06-26 RX ORDER — PRASUGREL 10 MG/1
10 TABLET, FILM COATED ORAL DAILY
Qty: 30 TAB | Refills: 11 | Status: SHIPPED
Start: 2020-06-26 | End: 2020-07-13

## 2020-06-26 RX ORDER — ASPIRIN 81 MG/1
81 TABLET ORAL DAILY
Qty: 30 TAB | Refills: 11 | Status: SHIPPED
Start: 2020-06-27 | End: 2020-07-13

## 2020-06-26 RX ADMIN — THERA TABS 1 TABLET: TAB at 04:23

## 2020-06-26 RX ADMIN — CALCIUM CARBONATE-CHOLECALCIFEROL TAB 250 MG-125 UNIT 2 TABLET: 250-125 TAB at 04:23

## 2020-06-26 RX ADMIN — PRASUGREL 10 MG: 10 TABLET, FILM COATED ORAL at 04:27

## 2020-06-26 RX ADMIN — DOCUSATE SODIUM 50 MG AND SENNOSIDES 8.6 MG 2 TABLET: 8.6; 5 TABLET, FILM COATED ORAL at 04:23

## 2020-06-26 RX ADMIN — ASPIRIN 81 MG: 81 TABLET, COATED ORAL at 04:23

## 2020-06-26 ASSESSMENT — GAIT ASSESSMENTS
DEVIATION: NO DEVIATION
GAIT LEVEL OF ASSIST: INDEPENDENT
DISTANCE (FEET): 525

## 2020-06-26 ASSESSMENT — COGNITIVE AND FUNCTIONAL STATUS - GENERAL
SUGGESTED CMS G CODE MODIFIER MOBILITY: CH
MOBILITY SCORE: 24

## 2020-06-26 NOTE — CARE PLAN
Problem: Communication  Goal: The ability to communicate needs accurately and effectively will improve  Outcome: PROGRESSING AS EXPECTED   Patient communicates appropriately    Problem: Safety  Goal: Will remain free from injury  Outcome: PROGRESSING AS EXPECTED  Goal: Will remain free from falls  Outcome: PROGRESSING AS EXPECTED   Patient calls for assistance. All fall risk prevention interventions in place.

## 2020-06-26 NOTE — OR NURSING
1400 Report from SETH Wright. Pt awake and alert, denies pain or nausea. VSS. No needs at this time. R wrist with TR band in place, CD and soft.  1415 2mls of air removed from TR band, bleeding noted air replaced.   1445 2mls of air removed from TR band, site CDI and no signs of bleeding  1500 2mls of air removed from TR band, site CDI and no signs of bleeding  1515 2mls of air removed from TR band, site CDI and no signs of bleeding  1530 2mls of air removed from TR band, site CDI and no signs of bleeding  1545 2mls of air removed from TR band, site CDI and no signs of bleeding  1550 Pt ambulated to restroom, tolerated well  1600 2mls of air removed from TR band, site CDI and no signs of bleeding  1615 2mls of air removed from TR band, site CDI and no signs of bleeding  1620 Gauze and tegaderm placed on R wrist. CDI and soft, no signs of bleeding.  1625 Criteria met, R wrist site CDI and soft, no signs of bleeding.  1630 Report called to SETH Mathias  1640 Pt transferred to [facility] by RN with all belongings without incident. Handoff to SETH Mathias.

## 2020-06-26 NOTE — PROGRESS NOTES
Received report from day staff. Assumed pt care. Patient is AOX4. POC discussed. Tele monitor in place. Call light within reach, bed in lowest position, bed alarm on, and personal items accessible.

## 2020-06-26 NOTE — THERAPY
"Physical Therapy   Initial Evaluation     Patient Name: Trina Naidu  Age:  70 y.o., Sex:  female  Medical Record #: [id number]  Today's Date: 6/26/2020     Precautions: Cardiac Precautions (See Comments)    Assessment  Patient is 70 y.o. female with a diagnosis of orthopnea and abnormal ballistocardiogram, s/p heart cath. Pt lives alone in a [facility] with her adult sons living on the same property. Pt was fully IND PTA. Pt presents today at baseline LOF. Pt educated on cardiac precautions and activity recommendations for progressive walking program with appropriate RPE. No further acute PT needs.     Plan    Recommend Physical Therapy for Evaluation only.    Discharge recommendations:  Anticipate that the patient will have no further physical therapy needs after discharge from the hospital. Pt not interested in cardiac rehab.       Subjective    Pt agrees to PT. \"When can I go home?\"     Objective       06/26/20 0914   Prior Living Situation   Prior Services Home-Independent   Housing / Facility 1 Story House   Steps Into Home 3   Rail Left Rail  (Steps into Home)   Equipment Owned None   Lives with - Patient's Self Care Capacity Alone and Able to Care For Self   Comments Pt's sons live on same property   Prior Level of Functional Mobility   Bed Mobility Independent   Transfer Status Independent   Ambulation Independent   Distance Ambulation (Feet) 800   Assistive Devices Used None   Stairs Independent   History of Falls   History of Falls No   Cognition    Cognition / Consciousness WDL   Passive ROM Lower Body   Passive ROM Lower Body WDL   Active ROM Lower Body    Active ROM Lower Body  WDL   Strength Lower Body   Lower Body Strength  WDL   Balance Assessment   Standing Balance (Static) Normal   Standing Balance (Dynamic) Normal   Weight Shift Standing Normal   Comments No AD   Gait Analysis   Gait Level Of Assist Independent   Assistive Device None   Distance (Feet) 525   Deviation No deviation   # of Stairs Climbed " 3   Level of Assist with Stairs Independent   Skilled Intervention Verbal Cuing   Functional Mobility   Sit to Stand Independent   Toilet Transfers Independent   Mobility gait in becerril   Skilled Intervention Verbal Cuing;Compensatory Strategies   Patient / Family Goals    Patient / Family Goal #1 Home asap   Education Group   Cardiac Precautions Patient Response Patient;Acceptance;Handout;Demonstration;Explanation;Action Demonstration;Verbal Demonstration   Problem List    Problems None   Anticipated Discharge Equipment   DC Equipment None

## 2020-06-26 NOTE — PROGRESS NOTES
Assumed care of patient and report received from previous RN. VSS, pt aaox4. Appropriate fall precautions in place. No needs at this time

## 2020-06-26 NOTE — CARE PLAN
Problem: Communication  Goal: The ability to communicate needs accurately and effectively will improve  Outcome: PROGRESSING AS EXPECTED     Problem: Safety  Goal: Will remain free from falls  Outcome: PROGRESSING AS EXPECTED  Intervention: Assess risk factors for falls  Flowsheets (Taken 6/25/2020 1720)  Pt Calls for Assistance: Yes     Problem: Venous Thromboembolism (VTW)/Deep Vein Thrombosis (DVT) Prevention:  Goal: Patient will participate in Venous Thrombosis (VTE)/Deep Vein Thrombosis (DVT)Prevention Measures  Outcome: PROGRESSING AS EXPECTED

## 2020-06-26 NOTE — PROGRESS NOTES
Monitor summary. .20/.08/.34 Sinus Rhythm-Sinus Tach  w/ rare PVC, occasional PAC, one 1.2 second pause overnight

## 2020-06-26 NOTE — DISCHARGE SUMMARY
"CHIEF COMPLAINT ON ADMISSION  Post Elective Cardiac Catheterization     CODE STATUS  Full    HPI & HOSPITAL COURSE  Trina Naidu is a 70-year-old female here for elective cardiac catheterization for heart spasms with factor V Leiden with history of DVT/PE, active smoker. The patient presented to the office with symptoms of \"heart spasm\", chest tightness, and tachycardia.  She was therefore sent for elective cardiac catheterization and cardiac catheterization showed 90% focal mid LCx stenosis, mild nonobstructive CAD otherwise, normal LVEF and LVEDP, with PCI to LCx 3.0 x 12 mm [place] PAIGE. Post-cath, the patient recovered well with no complications.  Her right radial site was clean, dry, and intact with no signs of hematoma, bleeding, or infection. The patient understands discharge instructions related to lifting precautions with radial site for one week. They were able to ambulate the halls without any exertional angina. Their vitals and laboratory workup were unremarkable. The patient was given thorough discussion of his discharge instructions per nursing and myself. DAPT and statin adherence were discussed in detail. Patient was discharged to home with family with no further questions/concerns, their outpatient follow up has been made by our office.    FOLLOW UP  Future Appointments   Date Time Provider Department Center   8/18/2020  1:00 PM [facility] EXAM 6 [facility]   9/29/2020  1:20 PM Víctor Galvan M.D. [facility] None         MEDICATIONS ON DISCHARGE  The patient will start the new medications ASA 81 mg, Effient 10 mg daily, and patient is currently on rosuvastatin 5 mg 3 times per week.  She will increase her dose of rosuvastatin by 1 day/week every 1-2 weeks so that she is taking rosuvastatin 5 mg daily. They will continue their home medications of Wellbutrin, chlorthalidone, lisinopril, and warfarin.  The patient has been on warfarin for approximately 7 years for her factor V and history of PE/DVT.  She " has been monitoring her own INRs with her sisters monitor.  I have ordered outpatient anticoagulation clinic to check on her and make sure everything is working well.    PROCEDURES  LEFT CARDIAC CATH on 6/25/2020  POST-OPERATIVE DIAGNOSES:   POSTOPERATIVE DIAGNOSIS:  1.  90% focal mid LCx stenosis  2.  Mild nonobstructive CAD otherwise  3.  Normal LVEF and LVEDP  4.  Successful PCI LCx (3.0 x 12 mm [place] PAIGE), excellent result    LABORATORY  Lab Results   Component Value Date/Time    SODIUM 136 02/14/2018 03:29 AM    POTASSIUM 4.2 02/14/2018 03:29 AM    CHLORIDE 106 02/14/2018 03:29 AM    CO2 22 02/14/2018 03:29 AM    GLUCOSE 95 02/14/2018 03:29 AM    BUN 16 02/14/2018 03:29 AM    CREATININE 1.02 02/14/2018 03:29 AM        Lab Results   Component Value Date/Time    WBC 8.0 02/14/2018 03:29 AM    HEMOGLOBIN 15.0 02/14/2018 03:29 AM    HEMATOCRIT 43.1 02/14/2018 03:29 AM    PLATELETCT 247 02/14/2018 03:29 AM

## 2020-06-26 NOTE — PROGRESS NOTES
Patient discharged from hospital to home. Tele monitor and IV removed with no signs or symptoms of bleeding or infection. Discussed prescriptions, discharge education, and symptom management/signs of worsening symptoms with patient. Patient verbalized understanding. Patient aware of follow-up appointments and to create one asap with cardiologist. Patient stable and vitals are within normal limits. Transported by walking to 's car with this RN. Patient to go straight to [facility] to  Effient.

## 2020-06-26 NOTE — PROGRESS NOTES
2 RN skin check complete with Bibiana ANN.  Devices in place: na.  Skin assess under devices: na.  Confirmed pressure ulcers found on: na.  New potential pressure ulcers noted on: na. Wound consult placed and wound reported: na.    Skin issues are as follows: BL ears, elbows, heels, sacrum pink and blanching. Generalized bruising. Flaky ankles/feet. Right radial cath site CDI.    The following interventions in place: patient turns self from side to side, pressure redistribution mattress, pillows in use for support/positioning.

## 2020-06-26 NOTE — DISCHARGE INSTRUCTIONS
1.  Aspirin 81 mg for 30 days then discontinue  2.  Continue current medications  3.  Rosuvastatin 5 mg Monday Wednesday Friday, every 1-2 weeks add 1 more day of rosuvastatin to eventually be on it daily  4.  Presugrel (Effient) obtain monthly from [facility] in [place].  Do not miss 1 dose of this medication.  You will take it for 1 year.  5.  No smoking    Angiogram, Angioplasty, or Stent Placement  Care After  One of the following procedures was done today.  ANGIOGRAM:  A catheter was placed through the blood vessel in your groin, contrast was injected into the vessels, and pictures were taken.  ANGIOPLASTY:  A catheter was placed through the blood vessel in your groin and directed to an area of blocked blood flow. A balloon, and possibly a metal stent were used to open the blockage. If no other blockages are present below this area, your symptoms should improve. If blockages are present below this area, surgery may still be necessary.  STENT:  A catheter was placed in your groin through which a metal mesh tube was placed in a narrowed part of the artery to facilitate blood flow.  You were given intravenous sedation. These medications are rapidly cleared from your bloodstream. You may feel some discomfort at the insertion site after the local anesthetic wears off. This discomfort should gradually improve over the next several days.  · Only take over-the-counter or prescription medicines for pain, discomfort, or fever as directed by your caregiver.  · Complications are very uncommon after this procedure. Go to the nearest emergency department if you develop any of the following symptoms:  · Worsening pain.  · Bleeding.  · Swelling at the puncture site.  · Lightheadedness.  · Dizziness or fainting.  · Fever or chills.  · If oozing, bleeding, or a lump appears at the puncture site, apply firm pressure directly to the site steadily for 15 minutes and go to the emergency department.  · Keep the skin around the insertion site  dry. You may take showers after 24 hours. If the area does get wet, dry the skin completely. Avoid baths until the skin puncture site heals, usually 5 to 7 days.  · Development of redness, increased soreness, or swelling may be signs of a skin infection. Contact your physician.  · Rest for the remainder of the day and avoid any heavy lifting (more than 10 pounds or 4.5 kg). Do not operate heavy machinery, drive, or make legal decisions for the first 24 hours after the procedure. Have a responsible person drive you home.  · You may resume your usual diet after the procedure. Avoid alcoholic beverages for 24 hours after the procedure.  Document Released: 12/18/2006 Document Revised: 03/11/2013 Document Reviewed: 10/17/2007  [employer]® Patient Information ©2014 [employer].      Discharge Instructions    Discharged to home by car with relative. Discharged via walking, hospital escort: Yes.  Special equipment needed: Not Applicable    Be sure to schedule a follow-up appointment with your primary care doctor or any specialists as instructed.     Discharge Plan:   Diet Plan: Discussed  Activity Level: Discussed  Smoking Cessation Offered: Patient Refused  Confirmed Follow up Appointment: Appointment Scheduled  Confirmed Symptoms Management: Discussed  Medication Reconciliation Updated: Yes    I understand that a diet low in cholesterol, fat, and sodium is recommended for good health. Unless I have been given specific instructions below for another diet, I accept this instruction as my diet prescription.   Other diet: Heart Healthy    Special Instructions: Diagnosis:  Acute Coronary Syndrome (ACS) is a diagnosis that encompasses cardiac-related chest pain and heart attack. ACS occurs when the blood flow to the heart muscle is severely reduced or cut off completely due to a slow process called atherosclerosis.  Atherosclerosis is a disease in which the coronary arteries become narrow from a buildup of fat, cholesterol, and  other substances that combine to form plaque. If the plaque breaks, a blood clot will form and block the blood flow to the heart muscle. This lack of blood flow can cause damage or death to the heart muscle which is called a heart attack or Myocardial Infarction (MI). There are two different types of MIs:  ST Elevation Myocardial Infarction or STEMI (the most severe type of heart attack) and Non-ST Elevation Myocardial Infarction or NSTEMI.    Treatment Plan:  · Cardiac Diet  - Low fat, low salt, low cholesterol   · Cardiac Rehab  - Your doctor has ordered you a referral to [facility].  Call [phone] to schedule an appointment.  · Attend my follow-up appointment with my Cardiologist.  · Take my medications as prescribed by my doctor  · Exercise daily  · Quit Smoking, Lower my bad cholesterol and raise my good cholesterol, lower my blood pressure and Reduce stress    Medications:  Certain medications are used to treat ACS.  Remember to always take medications as prescribed and never stop talking medications unless told by your doctor.    You have been prescribed the following medicatons:    Aspirin - Aspirin is used as a blood thinning medication and you will require this medication indefinitely.  Anti-platelet/blood thinner - Your Anti-platelet/Blood thinning medication is called Effient, and is used in combination with aspirin to prevent clots from forming in your heart and/or around your stent.  Your doctor will determine how long you need to be on this medicine.  Statin - Statin Crestor is used to lower cholesterol.  Angiotensin Converting Enzyme (ACE) Inhibitor - Angiotensin Converting Enzyme Inhibitor Lisinopril is used to lower blood pressure and treat heart failure.    · Is patient discharged on Warfarin / Coumadin?   Yes    You are receiving the drug warfarin. Please understand the importance of monitoring warfarin with scheduled PT/INR blood draws.  Follow-up with a call to your personal Doctor's office  "in 3 days to schedule a PT/INR.    IMPORTANT: HOW TO USE THIS INFORMATION:  This is a summary and does NOT have all possible information about this product. This information does not assure that this product is safe, effective, or appropriate for you. This information is not individual medical advice and does not substitute for the advice of your health care professional. Always ask your health care professional for complete information about this product and your specific health needs.      WARFARIN - ORAL (WARF-uh-rin)      COMMON BRAND NAME(S): Coumadin      WARNING:  Warfarin can cause very serious (possibly fatal) bleeding. This is more likely to occur when you first start taking this medication or if you take too much warfarin. To decrease your risk for bleeding, your doctor or other health care provider will monitor you closely and check your lab results (INR test) to make sure you are not taking too much warfarin. Keep all medical and laboratory appointments. Tell your doctor right away if you notice any signs of serious bleeding. See also Side Effects section.      USES:  This medication is used to treat blood clots (such as in deep vein thrombosis-DVT or pulmonary embolus-PE) and/or to prevent new clots from forming in your body. Preventing harmful blood clots helps to reduce the risk of a stroke or heart attack. Conditions that increase your risk of developing blood clots include a certain type of irregular heart rhythm (atrial fibrillation), heart valve replacement, recent heart attack, and certain surgeries (such as hip/knee replacement). Warfarin is commonly called a \"blood thinner,\" but the more correct term is \"anticoagulant.\" It helps to keep blood flowing smoothly in your body by decreasing the amount of certain substances (clotting proteins) in your blood.      HOW TO USE:  Read the Medication Guide provided by your pharmacist before you start taking warfarin and each time you get a refill. If you " have any questions, ask your doctor or pharmacist. Take this medication by mouth with or without food as directed by your doctor or other health care professional, usually once a day. It is very important to take it exactly as directed. Do not increase the dose, take it more frequently, or stop using it unless directed by your doctor. Dosage is based on your medical condition, laboratory tests (such as INR), and response to treatment. Your doctor or other health care provider will monitor you closely while you are taking this medication to determine the right dose for you. Use this medication regularly to get the most benefit from it. To help you remember, take it at the same time each day. It is important to eat a balanced, consistent diet while taking warfarin. Some foods can affect how warfarin works in your body and may affect your treatment and dose. Avoid sudden large increases or decreases in your intake of foods high in vitamin K (such as broccoli, cauliflower, cabbage, brussels sprouts, kale, spinach, and other green leafy vegetables, liver, green tea, certain vitamin supplements). If you are trying to lose weight, check with your doctor before you try to go on a diet. Cranberry products may also affect how your warfarin works. Limit the amount of cranberry juice (16 ounces/480 milliliters a day) or other cranberry products you may drink or eat.      SIDE EFFECTS:  Nausea, loss of appetite, or stomach/abdominal pain may occur. If any of these effects persist or worsen, tell your doctor or pharmacist promptly. Remember that your doctor has prescribed this medication because he or she has judged that the benefit to you is greater than the risk of side effects. Many people using this medication do not have serious side effects. This medication can cause serious bleeding if it affects your blood clotting proteins too much (shown by unusually high INR lab results). Even if your doctor stops your medication, this  risk of bleeding can continue for up to a week. Tell your doctor right away if you have any signs of serious bleeding, including: unusual pain/swelling/discomfort, unusual/easy bruising, prolonged bleeding from cuts or gums, persistent/frequent nosebleeds, unusually heavy/prolonged menstrual flow, pink/dark urine, coughing up blood, vomit that is bloody or looks like coffee grounds, severe headache, dizziness/fainting, unusual or persistent tiredness/weakness, bloody/black/tarry stools, chest pain, shortness of breath, difficulty swallowing. Tell your doctor right away if any of these unlikely but serious side effects occur: persistent nausea/vomiting, severe stomach/abdominal pain, yellowing eyes/skin. This drug rarely has caused very serious (possibly fatal) problems if its effects lead to small blood clots (usually at the beginning of treatment). This can lead to severe skin/tissue damage that may require surgery or amputation if left untreated. Patients with certain blood conditions (protein C or S deficiency) may be at greater risk. Get medical help right away if any of these rare but serious side effects occur: painful/red/purplish patches on the skin (such as on the toe, breast, abdomen), change in the amount of urine, vision changes, confusion, slurred speech, weakness on one side of the body. A very serious allergic reaction to this drug is rare. However, get medical help right away if you notice any symptoms of a serious allergic reaction, including: rash, itching/swelling (especially of the face/tongue/throat), severe dizziness, trouble breathing. This is not a complete list of possible side effects. If you notice other effects not listed above, contact your doctor or pharmacist. In the [place] - Call your doctor for medical advice about side effects. You may report side effects to FDA at 2-322-FDA-0175. In [place] - Call your doctor for medical advice about side effects. You may report side effects to [facility] at [phone].      PRECAUTIONS:  Before taking warfarin, tell your doctor or pharmacist if you are allergic to it; or if you have any other allergies. This product may contain inactive ingredients, which can cause allergic reactions or other problems. Talk to your pharmacist for more details. Before using this medication, tell your doctor or pharmacist your medical history, especially of: blood disorders (such as anemia, hemophilia), bleeding problems (such as bleeding of the stomach/intestines, bleeding in the brain), blood vessel disorders (such as aneurysms), recent major injury/surgery, liver disease, alcohol use, mental/mood disorders (including memory problems), frequent falls/injuries. It is important that all your doctors and dentists know that you take warfarin. Before having surgery or any medical/dental procedures, tell your doctor or dentist that you are taking this medication and about all the products you use (including prescription drugs, nonprescription drugs, and herbal products). Avoid getting injections into the muscles. If you must have an injection into a muscle (for example, a flu shot), it should be given in the arm. This way, it will be easier to check for bleeding and/or apply pressure bandages. This medication may cause stomach bleeding. Daily use of alcohol while using this medicine will increase your risk for stomach bleeding and may also affect how this medication works. Limit or avoid alcoholic beverages. If you have not been eating well, if you have an illness or infection that causes fever, vomiting, or diarrhea for more than 2 days, or if you start using any antibiotic medications, contact your doctor or pharmacist immediately because these conditions can affect how warfarin works. This medication can cause heavy bleeding. To lower the chance of getting cut, bruised, or injured, use great caution with sharp objects like safety razors and nail cutters. Use an electric  razor when shaving and a soft toothbrush when brushing your teeth. Avoid activities such as contact sports. If you fall or injure yourself, especially if you hit your head, call your doctor immediately. Your doctor may need to check you. The Food & Drug Administration has stated that generic warfarin products are interchangeable. However, consult your doctor or pharmacist before switching warfarin products. Be careful not to take more than one medication that contains warfarin unless specifically directed by the doctor or health care provider who is monitoring your warfarin treatment. Older adults may be at greater risk for bleeding while using this drug. This medication is not recommended for use during pregnancy because of serious (possibly fatal) harm to an unborn baby. Discuss the use of reliable forms of birth control with your doctor. If you become pregnant or think you may be pregnant, tell your doctor immediately. If you are planning pregnancy, discuss a plan for managing your condition with your doctor before you become pregnant. Your doctor may switch the type of medication you use during pregnancy. Very small amounts of this medication may pass into breast milk but is unlikely to harm a nursing infant. Consult your doctor before breast-feeding.      DRUG INTERACTIONS:  Drug interactions may change how your medications work or increase your risk for serious side effects. This document does not contain all possible drug interactions. Keep a list of all the products you use (including prescription/nonprescription drugs and herbal products) and share it with your doctor and pharmacist. Do not start, stop, or change the dosage of any medicines without your doctor's approval. Warfarin interacts with many prescription, nonprescription, vitamin, and herbal products. This includes medications that are applied to the skin or inside the vagina or rectum. The interactions with warfarin usually result in an increase  "or decrease in the \"blood-thinning\" (anticoagulant) effect. Your doctor or other health care professional should closely monitor you to prevent serious bleeding or clotting problems. While taking warfarin, it is very important to tell your doctor or pharmacist of any changes in medications, vitamins, or herbal products that you are taking. Some products that may interact with this drug include: capecitabine, imatinib, mifepristone. Aspirin, aspirin-like drugs (salicylates), and nonsteroidal anti-inflammatory drugs (NSAIDs such as ibuprofen, naproxen, celecoxib) may have effects similar to warfarin. These drugs may increase the risk of bleeding problems if taken during treatment with warfarin. Carefully check all prescription/nonprescription product labels (including drugs applied to the skin such as pain-relieving creams) since the products may contain NSAIDs or salicylates. Talk to your doctor about using a different medication (such as acetaminophen) to treat pain/fever. Low-dose aspirin and related drugs (such as clopidogrel, ticlopidine) should be continued if prescribed by your doctor for specific medical reasons such as heart attack or stroke prevention. Consult your doctor or pharmacist for more details. Many herbal products interact with warfarin. Tell your doctor before taking any herbal products, especially bromelains, coenzyme Q10, cranberry, danshen, dong quai, fenugreek, garlic, ginkgo biloba, ginseng, and Dominique's wort, among others. This medication may interfere with a certain laboratory test to measure theophylline levels, possibly causing false test results. Make sure laboratory personnel and all your doctors know you use this drug.      OVERDOSE:  If overdose is suspected, contact a poison control center or emergency room immediately. [place] residents can call the [place] National Poison Hotline at 1-671.102.3790. [place] residents can call a provincial poison control center. Symptoms of overdose may " include: bloody/black/tarry stools, pink/dark urine, unusual/prolonged bleeding.      NOTES:  Do not share this medication with others. Laboratory and/or medical tests (such as INR, complete blood count) must be performed periodically to monitor your progress or check for side effects. Consult your doctor for more details.      MISSED DOSE:  For the best possible benefit, do not miss any doses. If you do miss a dose and remember on the same day, take it as soon as you remember. If you remember on the next day, skip the missed dose and resume your usual dosing schedule. Do not double the dose to catch up because this could increase your risk for bleeding. Keep a record of missed doses to give to your doctor or pharmacist. Contact your doctor or pharmacist if you miss 2 or more doses in a row.      STORAGE:  Store at room temperature away from light and moisture. Do not store in the bathroom. Keep all medications away from children and pets. Do not flush medications down the toilet or pour them into a drain unless instructed to do so. Properly discard this product when it is  or no longer needed. Consult your pharmacist or local waste disposal company for more details about how to safely discard your product.      MEDICAL ALERT:  Your condition and medication can cause complications in a medical emergency. For information about enrolling in MedicAlert, call 1-251.419.6285 ([place]) or 1-411.470.9654 ([place]).      Information last revised 2010 Copyright(c)  [employer]             Depression / Suicide Risk    As you are discharged from this [facility] facility, it is important to learn how to keep safe from harming yourself.    Recognize the warning signs:  · Abrupt changes in personality, positive or negative- including increase in energy   · Giving away possessions  · Change in eating patterns- significant weight changes-  positive or negative  · Change in sleeping patterns- unable to sleep  or sleeping all the time   · Unwillingness or inability to communicate  · Depression  · Unusual sadness, discouragement and loneliness  · Talk of wanting to die  · Neglect of personal appearance   · Rebelliousness- reckless behavior  · Withdrawal from people/activities they love  · Confusion- inability to concentrate     If you or a loved one observes any of these behaviors or has concerns about self-harm, here's what you can do:  · Talk about it- your feelings and reasons for harming yourself  · Remove any means that you might use to hurt yourself (examples: pills, rope, extension cords, firearm)  · Get professional help from the community (Mental Health, Substance Abuse, psychological counseling)  · Do not be alone:Call your Safe Contact- someone whom you trust who will be there for you.  · Call your local CRISIS HOTLINE 544-9546 or 142-371-2172  · Call your local Children's Mobile Crisis Response Team [place] (668) 352-6965 or www.R[employer] Yourself  · Call the toll free National Suicide Prevention Hotlines   · National Suicide Prevention Lifeline 162-992-ADAB (5823)  · National Hope Line Network 800-SUICIDE (212-2346)

## 2020-06-29 ENCOUNTER — TELEPHONE (OUTPATIENT)
Dept: VASCULAR LAB | Facility: MEDICAL CENTER | Age: 70
End: 2020-06-29

## 2020-06-29 NOTE — TELEPHONE ENCOUNTER
Attempted to reach pt regarding referral to anticoagulation from [facility] for warfarin.     Phone rang with No VM set up.     Pt has been on warfarin but managing her self per note on 6/26/20 from Tammy Dixon, she was recently started on Effient and ASA post  PCI 6/26/20        Cassidy Hoffman, De[place]

## 2020-07-01 ENCOUNTER — TELEPHONE (OUTPATIENT)
Dept: CARDIOLOGY | Facility: MEDICAL CENTER | Age: 70
End: 2020-07-01

## 2020-07-01 ENCOUNTER — TELEPHONE (OUTPATIENT)
Dept: VASCULAR LAB | Facility: MEDICAL CENTER | Age: 70
End: 2020-07-01

## 2020-07-01 NOTE — TELEPHONE ENCOUNTER
The following alternatives are the preferred alternatives: Brilinta, Clopidogrel.    PAR sent to plan:    Trina Naidu Camp: [id number] - PA Case ID: [id number] help? Call us at [phone]  Status  Sent to [facility] [facility]  Drug  Prasugrel HCl 10MG tablets  Form  [employer] Electronic Prior Authorization Form (2017 NCPDP)    ID#[id number]

## 2020-07-01 NOTE — TELEPHONE ENCOUNTER
Attempted to reach pt regarding referral to anticoagulation from [facility] for warfarin.      Spoke with pt. She declined to establish with the clinic. She will call us if things change. She has her own home monitor which she uses weekly. INR 2.1 this week.      Pt has been on warfarin but managing her self per note on 6/26/20 from Tammy Dixon, she was recently started on Effient and ASA post  PCI 6/26/20     Irma Becerra, PharmD

## 2020-07-09 NOTE — PROGRESS NOTES
Chief Complaint   Patient presents with   • Hyperlipidemia     PP of VR       Subjective:   Trina Naidu is a 70 y.o. female who presents today for follow-up.    Patient of Dr. Galvan.  Current medical problems include factor V Leiden with history of DVT/PE, tobacco use, history of abnormal stress test in 2018.  She was referred for coronary angiogram which showed a 90% focal mid LCx stenosis s/p 3.0 x 12 mm Plover PAIGE with nonobstructive disease in the LM. LAD and RCA on Effient and aspirin.    Has been feeling well after the stent. No chest pains, pressure, dyspnea. Was very tired after starting the metoprolol but now she is much better. She has frequent bruising and her INR was elevated because she changed her diet to include more vegetables.     She was not able to tolerate Crestor 5 days a week, her hips joints and hands became very stiff and uncomfortable. She is tolerating the 3 day/week dosing much better.     She was not able to afford the Wellbutrin or the patches. Hasn't been able to cut down but is trying different techniques like hiding her cigarettes in the bathroom.   Past Medical History:   Diagnosis Date   • Blood clotting disorder (HCC) 2014    ankle, leg, lung   • Breath shortness     uses o2   • Dental disorder     upper   • Heart burn    • High cholesterol    • Hypertension    • Indigestion      Past Surgical History:   Procedure Laterality Date   • OTHER      urethra as teenager     No family history on file.  Social History     Socioeconomic History   • Marital status: Single     Spouse name: Not on file   • Number of children: Not on file   • Years of education: Not on file   • Highest education level: Not on file   Occupational History   • Not on file   Social Needs   • Financial resource strain: Not on file   • Food insecurity     Worry: Not on file     Inability: Not on file   • Transportation needs     Medical: Not on file     Non-medical: Not on file   Tobacco Use   • Smoking  status: Current Every Day Smoker     Packs/day: 1.00     Years: 56.00     Pack years: 56.00     Types: Cigarettes   • Smokeless tobacco: Never Used   Substance and Sexual Activity   • Alcohol use: Never     Frequency: Never   • Drug use: Never   • Sexual activity: Not on file   Lifestyle   • Physical activity     Days per week: Not on file     Minutes per session: Not on file   • Stress: Not on file   Relationships   • Social connections     Talks on phone: Not on file     Gets together: Not on file     Attends Advent service: Not on file     Active member of club or organization: Not on file     Attends meetings of clubs or organizations: Not on file     Relationship status: Not on file   • Intimate partner violence     Fear of current or ex partner: Not on file     Emotionally abused: Not on file     Physically abused: Not on file     Forced sexual activity: Not on file   Other Topics Concern   • Not on file   Social History Narrative   • Not on file     No Known Allergies  Outpatient Encounter Medications as of 7/13/2020   Medication Sig Dispense Refill   • ezetimibe (ZETIA) 10 MG Tab Take 1 Tab by mouth every day. 90 Tab 3   • metoprolol SR (TOPROL XL) 25 MG TABLET SR 24 HR Take 1 Tab by mouth every day. 30 Tab 11   • clopidogrel (PLAVIX) 300 MG Take 1 Tab by mouth Once for 1 dose. Take 300mg on day 1 of plavix (day after you stop the Effient) 1 Tab 0   • clopidogrel (PLAVIX) 75 MG Tab Take 1 Tab by mouth every day. Start the day after you take the 300mg Plavix tab 30 Tab 10   • rosuvastatin (CRESTOR) 5 MG Tab Take 1 Tab by mouth every day. 30 Tab 11   • Calcium Carb-Cholecalciferol (CALCIUM 600+D) 600-800 MG-UNIT Tab Take  by mouth every day.     • Coenzyme Q10 (COQ10 PO) Take  by mouth. Takes 3 times per week     • MULTIPLE VITAMIN PO Take  by mouth every day.     • chlorthalidone (HYGROTON) 25 MG Tab every bedtime. TAKE 1 TABLET BY MOUTH DAILY     • lisinopril (PRINIVIL) 20 MG Tab 10 mg every bedtime. TAKE  "1 TABLET BY MOUTH DAILY     • warfarin (COUMADIN) 5 MG Tab every bedtime. TAKE 1 TABLET BY MOUTH DAILY     • [DISCONTINUED] BUPROPION HBR ER PO      • [DISCONTINUED] rosuvastatin (CRESTOR) 5 MG Tab      • [DISCONTINUED] aspirin EC 81 MG EC tablet Take 1 Tab by mouth every day. 30 Tab 11   • [DISCONTINUED] prasugrel (EFFIENT) 10 MG Tab Take 1 Tab by mouth every day. 30 Tab 11   • [DISCONTINUED] metoprolol SR (TOPROL XL) 25 MG TABLET SR 24 HR Take 0.5 Tabs by mouth every day. 30 Tab 11   • [DISCONTINUED] buPROPion SR (WELLBUTRIN SR) 150 MG TABLET SR 12 HR sustained-release tablet For three days take one tab per day and the one tab twice per day; 12 weeks. 90 Tab 1     No facility-administered encounter medications on file as of 7/13/2020.      Review of Systems   Constitutional: Negative for malaise/fatigue and weight loss.   Respiratory: Positive for cough (chronic). Negative for shortness of breath.    Cardiovascular: Negative for chest pain, orthopnea and leg swelling.   Gastrointestinal: Negative for blood in stool and melena.   Genitourinary: Negative for hematuria.   Musculoskeletal: Positive for joint pain and myalgias.   Neurological: Negative for dizziness.   Endo/Heme/Allergies: Bruises/bleeds easily.   Psychiatric/Behavioral: Positive for substance abuse (tobacco use).        Objective:   /74 (BP Location: Right arm, Patient Position: Sitting, BP Cuff Size: Adult)   Pulse 98   Resp 12   Ht 1.613 m (5' 3.5\")   Wt 91.7 kg (202 lb 3.2 oz)   SpO2 95%   BMI 35.26 kg/m²     Physical Exam   Constitutional: She is oriented to person, place, and time. She appears well-developed and well-nourished. No distress.   HENT:   Head: Normocephalic and atraumatic.   Eyes: Conjunctivae are normal. No scleral icterus.   Neck: Neck supple. No JVD present.   Cardiovascular: Normal rate, regular rhythm and intact distal pulses.   No murmur heard.  No carotid bruits  Radial pulses 2+ bilaterally  R PT pulses 2+, left " PT pulse 1+     Pulmonary/Chest: Effort normal and breath sounds normal. No respiratory distress. She has no wheezes. She has no rales.   Musculoskeletal:         General: No edema.   Neurological: She is alert and oriented to person, place, and time. No cranial nerve deficit.   Skin: Skin is warm and dry. She is not diaphoretic. No pallor.   Psychiatric: Judgment normal.   Vitals reviewed.       TTE 2/6/2018 w Dr [place] outside study  -normal LV size, inferior base mild hypokinesis, normal systolic function, mild concentric LVH  -mild LAE  -mild MR  -mild RV and RA enlargement and preserved RV function     [facility] 6/25/2020  FINDINGS:  I. HEMODYNAMICS:               Ao: 74/57 mmHg              LEDP: 12 mmHg              Gradient on LV pullback: No     II. LEFT VENTRICULOGRAM:              LVEF STANTON PROJECTION: 60 %                III. CORONARY ANGIOGRAPHY:  Left Main: Moderate caliber bifurcating mild nonobstructive CAD.  Left Anterior Descending: Large caliber usual common of diagonals mild nonobstructive CAD.  Left Circumflex: Large and dominant supplying a moderate sized first obtuse marginal.  Just after the takeoff of the OM there is a 90% eccentric focal stenosis which is noncalcified in the circumflex proper.  Post PCI 0% residual stenosis and LOYDA-3 flow.  Right Coronary: Moderate size nondominant vessel with a 40% stenosis in its proximal to midportion which is nonobstructive.  POSTOPERATIVE DIAGNOSIS:  1.  90% focal mid LCx stenosis  2.  Mild nonobstructive CAD otherwise  3.  Normal LVEF and LVEDP  4.  Successful PCI LCx (3.0 x 12 mm Lul PAIGE), excellent result       Assessment:     1. Tobacco abuse  REFERRAL TO TOBACCO CESSATION PROGRAM   2. Mixed hyperlipidemia  LIPID PANEL   3. S/P coronary artery stent placement  REFERRAL TO TOBACCO CESSATION PROGRAM    LIPID PANEL   4. Coronary artery disease due to calcified coronary lesion     5. Factor V deficiency (HCC)         Medical Decision Making:  Today's  Assessment / Status / Plan:   Ischemic Heart Disease, Stable  - S/P PAIGE to LCx 6/2020  - DAPT: STOP aspirin, Effient not covered by insurance. After 30day RX runs out, convert to Plavix, load 300mg then 75mg thereafter  - high intensity statin: unable to tolerate anything higher than Crestor 5mg 3x/wk. Add Zetia. Check lipid panel in 3mo  - beta blocker: inc Metoprolol to 25mg if she can tolerate for better beta blockade  - Cardiac rehab: referred    Tobacco use  - may try to buy Wellbutrin once her co-pay for Effient is taken care off  - discussed quitting techniques, referral to cessation program although didn't have much success previously  - goal is to reduce cigarettes to 10/day or less  - discussed 4min    HTN, controlled  - lisinopril 10    HLD, uncontrolled  - shee above, crestor 3/weekly, add Zetia, lipid panel in 3mo  - discussed diet recommendations, she eats a lot of processed meats and cheeses    Factor V Leiden  - continue warfarin and P2Y12, stop aspirin  - INR checks with coumadin clinic    RTC in 3mo to discuss RF modification (lipids, tobacco use), sooner if problems    Staffed with Dr. Galvan

## 2020-07-10 ENCOUNTER — TELEPHONE (OUTPATIENT)
Dept: CARDIOLOGY | Facility: MEDICAL CENTER | Age: 70
End: 2020-07-10

## 2020-07-10 ENCOUNTER — ANTICOAGULATION MONITORING (OUTPATIENT)
Dept: MEDICAL GROUP | Facility: PHYSICIAN GROUP | Age: 70
End: 2020-07-10

## 2020-07-10 DIAGNOSIS — D68.2 FACTOR V DEFICIENCY (HCC): ICD-10-CM

## 2020-07-10 PROBLEM — I26.99 PULMONARY EMBOLISM (HCC): Status: ACTIVE | Noted: 2020-07-10

## 2020-07-10 PROBLEM — I82.409 DEEP VEIN THROMBOSIS (HCC): Status: ACTIVE | Noted: 2020-07-10

## 2020-07-10 LAB — INR PPP: 4.3 (ref 2–3.5)

## 2020-07-10 NOTE — TELEPHONE ENCOUNTER
Returned call. Pt reports that she has been monitoring her own INR for years, and she has been stable on her dose of warfarin 5mg daily for about 6 years. She usually runs 2.1-2.9, but today she was 4.3. She has not made any dietary changes in vitamin K intake. She checks about every 2 weeks, but she thinks INR has probably been high for about 5 days, because at that time she started noticing increased bruising every time she bumps something. She now has bruising all over arms, legs, and abdomen. No active bleeding sources. She started on Effient and aspirin in addition to warfarin after stent placement in June. She declined Coumadin Clinic monitoring because she had been stable managing on her own, but she is now open to following with them remotely. Advised that at this time she can try increasing her dietary intake of vitamin K, and that we would call her back if we have further recommendations.

## 2020-07-10 NOTE — TELEPHONE ENCOUNTER
MARÍA ELENA/roxanne  (This is the call I just talked to you about, she anticipates your call)    Pt calling to report INR 4.3 this morning, bruises all over, any bump begins bleeding.    Trina is at

## 2020-07-10 NOTE — PROGRESS NOTES
Anticoagulation Summary  As of 7/10/2020    INR goal:   2.0-3.0   TTR:   --   INR used for dosin.30! (7/10/2020)   Warfarin maintenance plan:   5 mg (5 mg x 1) every day   Weekly warfarin total:   35 mg   Plan last modified:   Philipp Baker, PharmD (7/10/2020)   Next INR check:   7/15/2020   Target end date:   Indefinite    Indications    Factor V deficiency (HCC) [D68.2]  Deep vein thrombosis (HCC) [I82.409]  Pulmonary embolism (HCC) [I26.99]             Anticoagulation Episode Summary     INR check location:   Anticoagulation Clinic    Preferred lab:       Send INR reminders to:       Comments:           Anticoagulation Patient Findings        HPI:  Trina Naidu referred to the [facility] for anticoagulation management.  She has been on warfarin for about 3 to 4 years due to a history of a deep vein thrombosis and pulmonary embolism.  She has a factor V deficiency.  Her daughter also has the same condition.  She has a home monitor and will call her results and us and would just like help adjusting her dose.  We got a recent referral to take over her care.  Most of the time she adjusts her INR herself.  She would be a good candidate for the patient self monitoring program after we get a little bit more history on her.      HAS-BLED:  Hypertension   Uncontrolled, >160 mmHg systolic- n  Renal disease Dialysis, transplant, Cr >2.26 mg/dL or >200 µmol/L - n  Liver disease Cirrhosis or bilirubin >2x normal with AST/ALT/AP >3x normal- n  Stroke [id number] major bleeding or predisposition to bleeding- n  Labile INR Unstable/high INRs, time in therapeutic range <60%- n  Age >65- y  Medication usage predisposing to bleeding Aspirin, clopidogrel, NSAIDs - y  Alcohol use  ?8 drinks/week- n      Pt gave verbal consent  to leave a VM with detailed medical information regarding INR and dose of warfarin.    Pt education done (s/s of bleeding, when to f/u with clinic vs ER, foods with vitamin K, etc)      Assessment:  INR  supra-therapeutic.      Pt tolerating medication well, no s/s of bleeding.     Med rec done with pt    Are there any clinically significant drug interactions?--Also on aspirin and Plavix for new stent    Plan   Hold today then resume normal dose    Follow up:  Follow up appointment in 3-4 days       Philipp Baker, FernandezD

## 2020-07-13 ENCOUNTER — OFFICE VISIT (OUTPATIENT)
Dept: CARDIOLOGY | Facility: MEDICAL CENTER | Age: 70
End: 2020-07-13
Payer: MEDICARE

## 2020-07-13 VITALS
SYSTOLIC BLOOD PRESSURE: 112 MMHG | RESPIRATION RATE: 12 BRPM | DIASTOLIC BLOOD PRESSURE: 74 MMHG | WEIGHT: 202.2 LBS | BODY MASS INDEX: 34.52 KG/M2 | HEART RATE: 98 BPM | HEIGHT: 64 IN | OXYGEN SATURATION: 95 %

## 2020-07-13 DIAGNOSIS — Z95.5 S/P CORONARY ARTERY STENT PLACEMENT: ICD-10-CM

## 2020-07-13 DIAGNOSIS — I25.10 CORONARY ARTERY DISEASE DUE TO CALCIFIED CORONARY LESION: ICD-10-CM

## 2020-07-13 DIAGNOSIS — D68.2 FACTOR V DEFICIENCY (HCC): ICD-10-CM

## 2020-07-13 DIAGNOSIS — E78.2 MIXED HYPERLIPIDEMIA: ICD-10-CM

## 2020-07-13 DIAGNOSIS — Z72.0 TOBACCO ABUSE: ICD-10-CM

## 2020-07-13 DIAGNOSIS — I25.84 CORONARY ARTERY DISEASE DUE TO CALCIFIED CORONARY LESION: ICD-10-CM

## 2020-07-13 PROCEDURE — 99214 OFFICE O/P EST MOD 30 MIN: CPT | Mod: 25 | Performed by: PHYSICIAN ASSISTANT

## 2020-07-13 PROCEDURE — 99406 BEHAV CHNG SMOKING 3-10 MIN: CPT | Performed by: PHYSICIAN ASSISTANT

## 2020-07-13 RX ORDER — CLOPIDOGREL 300 MG/1
300 TABLET, FILM COATED ORAL ONCE
Qty: 1 TAB | Refills: 0 | Status: SHIPPED | OUTPATIENT
Start: 2020-07-13 | End: 2020-07-13

## 2020-07-13 RX ORDER — CLOPIDOGREL BISULFATE 75 MG/1
75 TABLET ORAL DAILY
Qty: 30 TAB | Refills: 10 | Status: SHIPPED | OUTPATIENT
Start: 2020-07-13 | End: 2021-04-29

## 2020-07-13 RX ORDER — METOPROLOL SUCCINATE 25 MG/1
25 TABLET, EXTENDED RELEASE ORAL DAILY
Qty: 30 TAB | Refills: 11 | Status: SHIPPED | OUTPATIENT
Start: 2020-07-13 | End: 2021-05-24

## 2020-07-13 RX ORDER — EZETIMIBE 10 MG/1
10 TABLET ORAL DAILY
Qty: 90 TAB | Refills: 3 | Status: SHIPPED | OUTPATIENT
Start: 2020-07-13 | End: 2020-10-26

## 2020-07-13 RX ORDER — ROSUVASTATIN CALCIUM 5 MG/1
TABLET, COATED ORAL
COMMUNITY
Start: 2020-06-12 | End: 2020-07-13

## 2020-07-13 ASSESSMENT — FIBROSIS 4 INDEX: FIB4 SCORE: 1.341640786499873818

## 2020-07-13 ASSESSMENT — ENCOUNTER SYMPTOMS
ORTHOPNEA: 0
SHORTNESS OF BREATH: 0
COUGH: 1
BLOOD IN STOOL: 0
DIZZINESS: 0
BRUISES/BLEEDS EASILY: 1
WEIGHT LOSS: 0
MYALGIAS: 1

## 2020-07-13 ASSESSMENT — LIFESTYLE VARIABLES: SUBSTANCE_ABUSE: 1

## 2020-07-13 NOTE — PATIENT INSTRUCTIONS
STOP ASPIRIN    You may take Crestor 5mg 3 times a week. Take Zetia 10mg once a day every day to help your cholesterol    When your Effient (prasugrel) prescription runs out you will start Plavix 75mg once a day. On the first day of your Plavix take 300mg, the following day you will take 75mg once a day and continue this for 1 year after your stent    Have your fasting cholesterol lab drawn in October before your next appointment with Dr. Víctor Galvan

## 2020-07-16 ENCOUNTER — TELEPHONE (OUTPATIENT)
Dept: VASCULAR LAB | Facility: MEDICAL CENTER | Age: 70
End: 2020-07-16

## 2020-07-17 ENCOUNTER — ANTICOAGULATION MONITORING (OUTPATIENT)
Dept: VASCULAR LAB | Facility: MEDICAL CENTER | Age: 70
End: 2020-07-17

## 2020-07-17 DIAGNOSIS — D68.2 FACTOR V DEFICIENCY (HCC): ICD-10-CM

## 2020-07-17 LAB — INR PPP: 4.2 (ref 2–3.5)

## 2020-07-17 NOTE — TELEPHONE ENCOUNTER
Initial anticoagulation clinic note and most recent cardiology note reviewed.    Any further recommendations, we will continue with indefinite warfarin and clopidogrel as recommended by cardiology for history of atrial fibrillation and CAD post PCI    We will defer all further cv care, aside from day to day management of anticoagulation to cardiology.     Michael Bloch, MD  Anticoagulation Clinic    Cc:  WATSON Victor

## 2020-07-17 NOTE — PROGRESS NOTES
Anticoagulation Summary  As of 2020    INR goal:   2.0-3.0   TTR:   --   INR used for dosin.20! (2020)   Warfarin maintenance plan:   2.5 mg (5 mg x 0.5) every Tue, Sat; 5 mg (5 mg x 1) all other days   Weekly warfarin total:   30 mg   Plan last modified:   Philipp Baker PharmD (2020)   Next INR check:   2020   Target end date:   Indefinite    Indications    Factor V deficiency (HCC) [D68.2]  Deep vein thrombosis (HCC) [I82.409]  Pulmonary embolism (HCC) [I26.99]             Anticoagulation Episode Summary     INR check location:   Anticoagulation Clinic    Preferred lab:       Send INR reminders to:       Comments:           Anticoagulation Patient Findings        Spoke to patient on the phone.   INR  supra-therapeutic.   Denies signs/symptoms of bleeding and/or thrombosis.   Denies changes to diet or medications.   Follow up appointment in 1 week(s).    Hold warfarin tonight then decrease weekly warfarin dose as noted      Philipp Baker PharmD, MS, BCACP, LCC    This note was created using voice recognition software (Dragon). The accuracy of the dictation is limited by the abilities of the software. I have reviewed the note prior to signing, however some errors in grammar and context are still possible. If you have any questions related to this note please do not hesitate to contact our office.

## 2020-07-24 ENCOUNTER — ANTICOAGULATION MONITORING (OUTPATIENT)
Dept: VASCULAR LAB | Facility: MEDICAL CENTER | Age: 70
End: 2020-07-24

## 2020-07-24 DIAGNOSIS — D68.2 FACTOR V DEFICIENCY (HCC): ICD-10-CM

## 2020-07-24 LAB — INR PPP: 1.8 (ref 2–3.5)

## 2020-07-24 NOTE — PROGRESS NOTES
Anticoagulation Summary  As of 2020    INR goal:   2.0-3.0   TTR:   72.9 % (4 d)   INR used for dosin.80! (2020)   Warfarin maintenance plan:   2.5 mg (5 mg x 0.5) every Tue, Sat; 5 mg (5 mg x 1) all other days   Weekly warfarin total:   30 mg   Plan last modified:   Fernandez WallsD (2020)   Next INR check:   2020   Target end date:   Indefinite    Indications    Factor V deficiency (HCC) [D68.2]  Deep vein thrombosis (HCC) [I82.409]  Pulmonary embolism (HCC) [I26.99]             Anticoagulation Episode Summary     INR check location:   Anticoagulation Clinic    Preferred lab:       Send INR reminders to:       Comments:           Anticoagulation Patient Findings  Patient Findings     Positives:   Signs/symptoms of thrombosis (Pt noted some ankle swelling and leg pain a few days ago that has improved.)    Negatives:   Signs/symptoms of bleeding, Laboratory test error suspected, Change in health, Change in alcohol use, Change in activity, Upcoming invasive procedure, Emergency department visit, Upcoming dental procedure, Missed doses, Extra doses, Change in medications, Change in diet/appetite, Hospital admission, Bruising, Other complaints          Spoke with Trina.  INR is sub therapeutic.   Pt denies any unusual s/s of bleeding, bruising or any changes to diet or medications. Denies any etoh, cranberries, supplements, or illness.   Pt verifies warfarin weekly dosing.     Will have pt continue with current regimen due to INR being labile over the last few weeks.    Repeat INR in 1 week(s).     Discussed this plan with Irma Becerra, PharmD    Manas Guerra, Pharmacy Intern

## 2020-08-03 ENCOUNTER — ANTICOAGULATION MONITORING (OUTPATIENT)
Dept: MEDICAL GROUP | Facility: PHYSICIAN GROUP | Age: 70
End: 2020-08-03

## 2020-08-03 DIAGNOSIS — D68.2 FACTOR V DEFICIENCY (HCC): ICD-10-CM

## 2020-08-03 LAB — INR PPP: 2.3 (ref 2–3.5)

## 2020-08-03 NOTE — PROGRESS NOTES
Anticoagulation Summary  As of 8/3/2020    INR goal:   2.0-3.0   TTR:   63.7 % (2 wk)   INR used for dosin.30 (8/3/2020)   Warfarin maintenance plan:   2.5 mg (5 mg x 0.5) every Tue, Sat; 5 mg (5 mg x 1) all other days   Weekly warfarin total:   30 mg   Plan last modified:   Philipp Baker PharmD (2020)   Next INR check:   2020   Target end date:   Indefinite    Indications    Factor V deficiency (HCC) [D68.2]  Deep vein thrombosis (HCC) [I82.409]  Pulmonary embolism (HCC) [I26.99]             Anticoagulation Episode Summary     INR check location:   Anticoagulation Clinic    Preferred lab:       Send INR reminders to:       Comments:           Anticoagulation Patient Findings    Spoke to patient on the phone.   INR  therapeutic.   Denies signs/symptoms of bleeding and/or thrombosis.   Denies changes to diet or medications.   Follow up appointment in 2 week(s).    Continue weekly warfarin dose as noted      Philipp Baker PharmD, MS, BCACP, LCC    This note was created using voice recognition software (Dragon). The accuracy of the dictation is limited by the abilities of the software. I have reviewed the note prior to signing, however some errors in grammar and context are still possible. If you have any questions related to this note please do not hesitate to contact our office.

## 2020-10-05 ENCOUNTER — HOSPITAL ENCOUNTER (OUTPATIENT)
Dept: RADIOLOGY | Facility: MEDICAL CENTER | Age: 70
End: 2020-10-05
Attending: INTERNAL MEDICINE | Admitting: INTERNAL MEDICINE
Payer: MEDICARE

## 2020-10-05 DIAGNOSIS — M79.605 PAIN IN BOTH LOWER EXTREMITIES: ICD-10-CM

## 2020-10-05 DIAGNOSIS — R09.89 OTHER SPECIFIED SYMPTOMS AND SIGNS INVOLVING THE CIRCULATORY AND RESPIRATORY SYSTEMS: ICD-10-CM

## 2020-10-05 DIAGNOSIS — M79.604 PAIN IN BOTH LOWER EXTREMITIES: ICD-10-CM

## 2020-10-05 PROCEDURE — 93922 UPR/L XTREMITY ART 2 LEVELS: CPT | Mod: 26 | Performed by: INTERNAL MEDICINE

## 2020-10-05 PROCEDURE — 93922 UPR/L XTREMITY ART 2 LEVELS: CPT

## 2020-10-09 ENCOUNTER — TELEPHONE (OUTPATIENT)
Dept: CARDIOLOGY | Facility: MEDICAL CENTER | Age: 70
End: 2020-10-09

## 2020-10-09 NOTE — TELEPHONE ENCOUNTER
Called patient to see if she had completed blood work ordered by TANYA to get results prior to upcoming appt with VR. She states she has not completed blood work and may not be able to before the appointment. Will follow-up as scheduled.

## 2020-10-13 ENCOUNTER — TELEPHONE (OUTPATIENT)
Dept: VASCULAR LAB | Facility: MEDICAL CENTER | Age: 70
End: 2020-10-13

## 2020-10-13 NOTE — LETTER
Trina Naidu  [address]  [place] [address]    10/13/20    Dear Trina Naidu ,    We have been unsuccessful in our attempts to contact you regarding your Anticoagulation Service appointments. Warfarin is a potent blood-thinning agent that requires monitoring to ensure that the dosage is correct for your body.  If it isn't, you could develop serious, sometimes life-threatening bleeding problems or life-threatening blood clots or stroke could result.    To monitor you effectively, we need to be able to communicate with you.  This is a requirement to be followed by our Service.       If you repeatedly fail to keep your lab appointments, you are at risk of being discharged from the Anticoagulation Service.    It is extremely important that you contact the clinic as soon as possible to arrange appropriate follow up.  We are open Monday-Friday 8 am until 5 pm.  You may reach our Service at [phone].           Sincerely,           Destin Santos, PharmD, [facility]  Clinic Supervisor  [facility]  Outpatient Anticoagulation Service

## 2020-10-13 NOTE — TELEPHONE ENCOUNTER
Left message for pt to have INR checked  Letter sent  Crystal Jiménez, Clinical Pharmacist, CDE, CACP

## 2020-10-26 ENCOUNTER — HOSPITAL ENCOUNTER (OUTPATIENT)
Dept: LAB | Facility: MEDICAL CENTER | Age: 70
End: 2020-10-26
Attending: PHYSICIAN ASSISTANT
Payer: MEDICARE

## 2020-10-26 ENCOUNTER — OFFICE VISIT (OUTPATIENT)
Dept: CARDIOLOGY | Facility: MEDICAL CENTER | Age: 70
End: 2020-10-26
Payer: MEDICARE

## 2020-10-26 VITALS
HEART RATE: 111 BPM | OXYGEN SATURATION: 97 % | HEIGHT: 63 IN | WEIGHT: 207 LBS | BODY MASS INDEX: 36.68 KG/M2 | DIASTOLIC BLOOD PRESSURE: 64 MMHG | SYSTOLIC BLOOD PRESSURE: 122 MMHG

## 2020-10-26 DIAGNOSIS — D68.2 FACTOR V DEFICIENCY (HCC): ICD-10-CM

## 2020-10-26 DIAGNOSIS — I82.4Z9 DEEP VEIN THROMBOSIS (DVT) OF DISTAL VEIN OF LOWER EXTREMITY, UNSPECIFIED CHRONICITY, UNSPECIFIED LATERALITY (HCC): ICD-10-CM

## 2020-10-26 DIAGNOSIS — Z95.5 S/P CORONARY ARTERY STENT PLACEMENT: ICD-10-CM

## 2020-10-26 DIAGNOSIS — Z72.0 TOBACCO ABUSE: ICD-10-CM

## 2020-10-26 DIAGNOSIS — I26.99 PULMONARY EMBOLISM, UNSPECIFIED CHRONICITY, UNSPECIFIED PULMONARY EMBOLISM TYPE, UNSPECIFIED WHETHER ACUTE COR PULMONALE PRESENT (HCC): ICD-10-CM

## 2020-10-26 DIAGNOSIS — E78.2 MIXED HYPERLIPIDEMIA: ICD-10-CM

## 2020-10-26 PROBLEM — R94.39 ABNORMAL NUCLEAR STRESS TEST: Status: RESOLVED | Noted: 2020-06-12 | Resolved: 2020-10-26

## 2020-10-26 LAB
CHOLEST SERPL-MCNC: 185 MG/DL (ref 100–199)
HDLC SERPL-MCNC: 41 MG/DL
LDLC SERPL CALC-MCNC: 117 MG/DL
TRIGL SERPL-MCNC: 133 MG/DL (ref 0–149)

## 2020-10-26 PROCEDURE — 80061 LIPID PANEL: CPT

## 2020-10-26 PROCEDURE — 99406 BEHAV CHNG SMOKING 3-10 MIN: CPT | Performed by: INTERNAL MEDICINE

## 2020-10-26 PROCEDURE — 36415 COLL VENOUS BLD VENIPUNCTURE: CPT

## 2020-10-26 PROCEDURE — 99214 OFFICE O/P EST MOD 30 MIN: CPT | Mod: 25 | Performed by: INTERNAL MEDICINE

## 2020-10-26 RX ORDER — ASPIRIN 81 MG/1
81 TABLET, CHEWABLE ORAL DAILY
COMMUNITY
End: 2020-10-26

## 2020-10-26 ASSESSMENT — ENCOUNTER SYMPTOMS
COUGH: 0
SHORTNESS OF BREATH: 0
ORTHOPNEA: 0
BACK PAIN: 0
ALTERED MENTAL STATUS: 0
IRREGULAR HEARTBEAT: 0
WEIGHT LOSS: 0
BLURRED VISION: 0
CLAUDICATION: 0
FLANK PAIN: 0
WEIGHT GAIN: 0
PND: 0
DEPRESSION: 0
HEARTBURN: 0
DIARRHEA: 0
VOMITING: 0
CONSTIPATION: 0
DIZZINESS: 0
NAUSEA: 0
SYNCOPE: 0
DYSPNEA ON EXERTION: 0
ABDOMINAL PAIN: 0
NEAR-SYNCOPE: 0
PALPITATIONS: 0
FEVER: 0
DECREASED APPETITE: 0

## 2020-10-26 ASSESSMENT — FIBROSIS 4 INDEX: FIB4 SCORE: 1.341640786499873818

## 2020-10-26 NOTE — PROGRESS NOTES
Cardiology Note    Chief Complaint   Patient presents with   • Hyperlipidemia     F/V Dx: S/P coronary artery stent placement & Abnormal nuclear stress test       History of Present Illness: Trina Naidu is a 70 y.o. female PMH active smoker, factor V leiden with hx DVT/PE admitted [facility] [place] 2016, initial visit for chest tightness s/p C with PCI PAIGE LCx 6/25/20 for who presents for follow up.    Since last visit underwent LHC with finding 90% stenosis LCx s/p PCI. Chest tightness resolved. Had muscle aches to rosuvastatin and doesn't want to attempt again. Had quit smoking for about 9 days but relapsed due to watching her weight increase. She has followed with smoking cessation program but admits travel is difficult. She will see if can do video visit.       Review of Systems   Constitution: Negative for decreased appetite, fever, malaise/fatigue, weight gain and weight loss.   HENT: Negative for congestion and nosebleeds.    Eyes: Negative for blurred vision.   Cardiovascular: Negative for chest pain, claudication, dyspnea on exertion, irregular heartbeat, leg swelling, near-syncope, orthopnea, palpitations, paroxysmal nocturnal dyspnea and syncope.   Respiratory: Negative for cough and shortness of breath.    Endocrine: Negative for cold intolerance and heat intolerance.   Skin: Negative for rash.   Musculoskeletal: Negative for back pain.   Gastrointestinal: Negative for abdominal pain, constipation, diarrhea, heartburn, melena, nausea and vomiting.   Genitourinary: Negative for dysuria, flank pain and hematuria.   Neurological: Negative for dizziness.   Psychiatric/Behavioral: Negative for altered mental status and depression.         Past Medical History:   Diagnosis Date   • Blood clotting disorder (HCC) 2014    ankle, leg, lung   • Breath shortness     uses o2   • Dental disorder     upper   • Heart burn    • High cholesterol    • Hypertension    • Indigestion          Past Surgical History:   Procedure  Laterality Date   • OTHER      urethra as teenager         Current Outpatient Medications   Medication Sig Dispense Refill   • Bempedoic Acid-Ezetimibe 180-10 MG Tab Take 1 Tab by mouth every bedtime. 90 Tab 3   • metoprolol SR (TOPROL XL) 25 MG TABLET SR 24 HR Take 1 Tab by mouth every day. 30 Tab 11   • clopidogrel (PLAVIX) 75 MG Tab Take 1 Tab by mouth every day. Start the day after you take the 300mg Plavix tab 30 Tab 10   • Calcium Carb-Cholecalciferol (CALCIUM 600+D) 600-800 MG-UNIT Tab Take  by mouth every day.     • MULTIPLE VITAMIN PO Take  by mouth every day.     • chlorthalidone (HYGROTON) 25 MG Tab every bedtime. TAKE 1 TABLET BY MOUTH DAILY     • lisinopril (PRINIVIL) 20 MG Tab 10 mg every bedtime. TAKE 1 TABLET BY MOUTH DAILY     • warfarin (COUMADIN) 5 MG Tab every bedtime. TAKE 1 TABLET BY MOUTH DAILY     • Coenzyme Q10 (COQ10 PO) Take  by mouth. Takes 3 times per week       No current facility-administered medications for this visit.          No Known Allergies      History reviewed. No pertinent family history.      Social History     Socioeconomic History   • Marital status: Single     Spouse name: Not on file   • Number of children: Not on file   • Years of education: Not on file   • Highest education level: Not on file   Occupational History   • Not on file   Social Needs   • Financial resource strain: Not on file   • Food insecurity     Worry: Not on file     Inability: Not on file   • Transportation needs     Medical: Not on file     Non-medical: Not on file   Tobacco Use   • Smoking status: Current Every Day Smoker     Packs/day: 1.00     Years: 56.00     Pack years: 56.00     Types: Cigarettes   • Smokeless tobacco: Never Used   Substance and Sexual Activity   • Alcohol use: Never     Frequency: Never   • Drug use: Never   • Sexual activity: Not on file   Lifestyle   • Physical activity     Days per week: Not on file     Minutes per session: Not on file   • Stress: Not on file  "  Relationships   • Social connections     Talks on phone: Not on file     Gets together: Not on file     Attends Islam service: Not on file     Active member of club or organization: Not on file     Attends meetings of clubs or organizations: Not on file     Relationship status: Not on file   • Intimate partner violence     Fear of current or ex partner: Not on file     Emotionally abused: Not on file     Physically abused: Not on file     Forced sexual activity: Not on file   Other Topics Concern   • Not on file   Social History Narrative   • Not on file         Physical Exam:  Ambulatory Vitals  /64 (BP Location: Left arm, Patient Position: Sitting, BP Cuff Size: Adult)   Pulse (!) 111   Ht 1.6 m (5' 3\")   Wt 93.9 kg (207 lb)   SpO2 97%    BP Readings from Last 4 Encounters:   10/26/20 122/64   07/13/20 112/74   06/26/20 132/91   06/12/20 118/82     Weight/BMI:   Vitals:    10/26/20 1418   BP: 122/64   Weight: 93.9 kg (207 lb)   Height: 1.6 m (5' 3\")    Body mass index is 36.67 kg/m².  Wt Readings from Last 4 Encounters:   10/26/20 93.9 kg (207 lb)   07/13/20 91.7 kg (202 lb 3.2 oz)   06/25/20 92.9 kg (204 lb 12.9 oz)   06/12/20 90.7 kg (199 lb 15.3 oz)       Physical Exam   Constitutional: She is oriented to person, place, and time and well-developed, well-nourished, and in no distress. No distress.   HENT:   Head: Normocephalic and atraumatic.   Eyes: Pupils are equal, round, and reactive to light. Conjunctivae are normal.   Neck: Normal range of motion. Neck supple. No JVD present.   Cardiovascular: Normal rate, regular rhythm, normal heart sounds and intact distal pulses. Exam reveals no gallop and no friction rub.   No murmur heard.  Pulmonary/Chest: Effort normal and breath sounds normal. No respiratory distress. She has no wheezes. She has no rales. She exhibits no tenderness.   Abdominal: Soft. Bowel sounds are normal. She exhibits no distension.   Musculoskeletal:         General: No edema. "   Neurological: She is alert and oriented to person, place, and time.   Skin: Skin is warm and dry.   Psychiatric: Affect and judgment normal.       Lab Data Review:  Lab Results   Component Value Date/Time    CHOLSTRLTOT 179 06/25/2020 09:20 AM     (H) 06/25/2020 09:20 AM    HDL 42 06/25/2020 09:20 AM    TRIGLYCERIDE 106 06/25/2020 09:20 AM       Lab Results   Component Value Date/Time    SODIUM 138 06/26/2020 02:10 AM    POTASSIUM 3.6 06/26/2020 02:10 AM    CHLORIDE 104 06/26/2020 02:10 AM    CO2 21 06/26/2020 02:10 AM    GLUCOSE 112 (H) 06/26/2020 02:10 AM    BUN 22 06/26/2020 02:10 AM    CREATININE 0.64 06/26/2020 02:10 AM     CrCl cannot be calculated (Patient's most recent lab result is older than the maximum 7 days allowed.).  Lab Results   Component Value Date/Time    ALKPHOSPHAT 73 06/25/2020 09:20 AM    ASTSGOT 15 06/25/2020 09:20 AM    ALTSGPT 20 06/25/2020 09:20 AM    TBILIRUBIN 0.4 06/25/2020 09:20 AM      Lab Results   Component Value Date/Time    WBC 9.4 06/26/2020 02:10 AM     Lab Results   Component Value Date/Time    HBA1C 5.8 (H) 06/25/2020 09:20 AM     No components found for: TROP    Outside labs [facility] 2017  , trig 180, HDL 36, tot chol 273    Cardiac Imaging and Procedures Review:      Rest JUAN F 10/5/20   Ankle-brachial index is normal.     [facility] 6/25/20  POSTOPERATIVE DIAGNOSIS:  1.  90% focal mid LCx stenosis  2.  Mild nonobstructive CAD otherwise  3.  Normal LVEF and LVEDP  4.  Successful PCI LCx (3.0 x 12 mm Lul PAIGE), excellent result    DINGS:  I. HEMODYNAMICS:               Ao: 74/57 mmHg              LEDP: 12 mmHg              Gradient on LV pullback: No     II. LEFT VENTRICULOGRAM:              LVEF STANTON PROJECTION: 60 %                III. CORONARY ANGIOGRAPHY:  Left Main: Moderate caliber bifurcating mild nonobstructive CAD.  Left Anterior Descending: Large caliber usual common of diagonals mild nonobstructive CAD.  Left Circumflex: Large and dominant supplying a moderate  "sized first obtuse marginal.  Just after the takeoff of the OM there is a 90% eccentric focal stenosis which is noncalcified in the circumflex proper.  Post PCI 0% residual stenosis and LOYDA-3 flow.  Right Coronary: Moderate size nondominant vessel with a 40% stenosis in its proximal to midportion which is nonobstructive.    TTE 2/6/2018 w Dr Birmingham outside study  -normal LV size, inferior base mild hypokinesis, normal systolic function, mild concentric LVH  -mild LAE  -mild MR  -mild RV and RA enlargement and preserved RV function    Nuclear stress spect exercise 2/6/2018 w Dr Birmingham outside study  -duration 3 min reaching MPHR 85%  -\"mild to moderate amount of partial to complete fixed defects of all segments base to apex (with some GI), lynn mild to moderate amount of partial to complete reversible ischemia of anteroseptal, septal, and inferoseptal segments at mid-ventricle and anterolateral base, with some mild partial to complete reversed redistribution of same segments consistent with some oft tissue attenuation; cannot rule out for prior event(s)\" ... ???    Medical Decision Making:  Problem List Items Addressed This Visit     Tobacco abuse    Hyperlipidemia    Relevant Medications    Bempedoic Acid-Ezetimibe 180-10 MG Tab    S/P coronary artery stent placement    Relevant Orders    REFERRAL TO FOLLOW-UP WITH PRIMARY CARE    Factor V deficiency (HCC)    Deep vein thrombosis (HCC)    Relevant Medications    Bempedoic Acid-Ezetimibe 180-10 MG Tab    Pulmonary embolism (HCC)    Relevant Medications    Bempedoic Acid-Ezetimibe 180-10 MG Tab    Other Relevant Orders    REFERRAL TO FOLLOW-UP WITH PRIMARY CARE        CAD / PCI - 12 months dapt or equivalent. In her case, coumadin and plavix going forward. Stop plavix in 12 months; 06/25/2021. Reminded to stop aspirin which she still takes for pain.    PE/DVT/factor V leiden - continue coumadin INR goal 2-3 followed by anticoag clinic.     HTN - controlled. " Continue regimen.    HLD (LDL >190) - convert to zetia-bempedoic acid combo. If still intolerant will need pcsk9 inhibitor. She has stopped using butter.    Tobacco abuse -  Discussed for 5 min. She has referral to smoking cessation program. Transportation an issue. Attempt video visit or telephone.     Referral to PMD for pain control leg to avoid aspirin / nsaids.     It was my pleasure to meet with Ms. Naidu.

## 2020-11-20 ENCOUNTER — TELEPHONE (OUTPATIENT)
Dept: VASCULAR LAB | Facility: MEDICAL CENTER | Age: 70
End: 2020-11-20

## 2020-11-20 NOTE — TELEPHONE ENCOUNTER
Left message for pt to have INR checked  2nd call  Letter sent  Crystal Jiménez, Clinical Pharmacist, CDE, CACP

## 2020-11-20 NOTE — LETTER
Trina Naidu  [address]  [place] [address]    11/20/20    Dear Trina Naidu ,    We have been unsuccessful in our attempts to contact you regarding your Anticoagulation Service appointments. Warfarin is a potent blood-thinning agent that requires monitoring to ensure that the dosage is correct for your body.  If it isn't, you could develop serious, sometimes life-threatening bleeding problems or life-threatening blood clots or stroke could result.    To monitor you effectively, we need to be able to communicate with you.  This is a requirement to be followed by our Service.       If you repeatedly fail to keep your lab appointments, you are at risk of being discharged from the Anticoagulation Service.    It is extremely important that you contact the clinic as soon as possible to arrange appropriate follow up.  We are open Monday-Friday 8 am until 5 pm.  You may reach our Service at [phone].           Sincerely,           Destin Santos, PharmD, [facility]  Clinic Supervisor  [facility]  Outpatient Anticoagulation Service

## 2020-12-16 ENCOUNTER — TELEPHONE (OUTPATIENT)
Dept: VASCULAR LAB | Facility: MEDICAL CENTER | Age: 70
End: 2020-12-16

## 2020-12-16 NOTE — LETTER
Trina Naidu  [address]  [place] [address]    01/22/21    Dear Trina Naidu ,    We have been unsuccessful in our attempts to contact you regarding your Anticoagulation Service appointments. Warfarin is a potent blood-thinning agent that requires monitoring to ensure that the dosage is correct for your body.  If it isn't, you could develop serious, sometimes life-threatening bleeding problems or life-threatening blood clots or stroke could result.    To monitor you effectively, we need to be able to communicate with you.  This is a requirement to be followed by our Service.       If you repeatedly fail to keep your lab appointments, you are at risk of being discharged from the Anticoagulation Service.    It is extremely important that you contact the clinic as soon as possible to arrange appropriate follow up.  We are open Monday-Friday 8 am until 5 pm.  You may reach our Service at [phone].           Sincerely,           Destin Santos, PharmD, [facility]  Clinic Supervisor  [facility]  Outpatient Anticoagulation Service

## 2020-12-16 NOTE — TELEPHONE ENCOUNTER
Left message for pt to have INR checked  3rd call  2nd letter sent    INR   Date Value Ref Range Status   08/03/2020 2.30  Final     No results found for: GILA Jiménez, Clinical Pharmacist, CDE, CACP

## 2020-12-16 NOTE — LETTER
Trina Naidu  [address]  [place] [address]    12/16/20    Dear Trina Naidu ,    We have been unsuccessful in our attempts to contact you regarding your Anticoagulation Service appointments. warfarin is a potent blood-thinning agent that requires monitoring to ensure that the dosage is correct for your body.  If it isn't, you could develop serious, sometimes life-threatening bleeding problems or life-threatening blood clots or stroke could result.    To monitor you effectively, we need to be able to communicate with you.  This is a requirement to be followed by our Service.       If you repeatedly fail to keep your lab appointments, you are at risk of being discharged from the Anticoagulation Service.    It is extremely important that you contact the clinic as soon as possible to arrange appropriate follow up.  We are open Monday-Friday 8 am until 5 pm.  You may reach our Service at [phone].           Sincerely,           Destin Santos, PharmD, [facility]  Clinic Supervisor  [facility]  Outpatient Anticoagulation Service

## 2021-01-22 ENCOUNTER — TELEPHONE (OUTPATIENT)
Dept: VASCULAR LAB | Facility: MEDICAL CENTER | Age: 71
End: 2021-01-22

## 2021-01-22 NOTE — TELEPHONE ENCOUNTER
Left message for pt to have INR checked  4th call  3rd letter sent  Crystal Jimnéez, Clinical Pharmacist, CDE, CACP

## 2021-01-22 NOTE — LETTER
Trina Naidu  [address]  [place] [address]    01/22/21    Dear Trina Naidu ,    We have been unsuccessful in our attempts to contact you regarding your Anticoagulation Service appointments. *** is a potent blood-thinning agent that requires monitoring to ensure that the dosage is correct for your body.  If it isn't, you could develop serious, sometimes life-threatening bleeding problems or life-threatening blood clots or stroke could result.    To monitor you effectively, we need to be able to communicate with you.  This is a requirement to be followed by our Service.       If you repeatedly fail to keep your lab appointments, you are at risk of being discharged from the Anticoagulation Service.    It is extremely important that you contact the clinic as soon as possible to arrange appropriate follow up.  We are open Monday-Friday 8 am until 5 pm.  You may reach our Service at [phone].           Sincerely,           Destin Santos, PharmD, [facility]  Clinic Supervisor  [facility]  Outpatient Anticoagulation Service

## 2021-02-10 ENCOUNTER — TELEPHONE (OUTPATIENT)
Dept: VASCULAR LAB | Facility: MEDICAL CENTER | Age: 71
End: 2021-02-10

## 2021-02-10 ENCOUNTER — ANTICOAGULATION MONITORING (OUTPATIENT)
Dept: MEDICAL GROUP | Facility: PHYSICIAN GROUP | Age: 71
End: 2021-02-10

## 2021-02-10 DIAGNOSIS — I26.99 PULMONARY EMBOLISM, UNSPECIFIED CHRONICITY, UNSPECIFIED PULMONARY EMBOLISM TYPE, UNSPECIFIED WHETHER ACUTE COR PULMONALE PRESENT (HCC): ICD-10-CM

## 2021-02-10 DIAGNOSIS — D68.2 FACTOR V DEFICIENCY (HCC): ICD-10-CM

## 2021-02-10 NOTE — TELEPHONE ENCOUNTER
Left message for pt to have INR checked  Left message with emergency contact    5th call    4th letter sent    INR   Date Value Ref Range Status   08/03/2020 2.30  Final     No results found for: GILA Jiménez, Clinical Pharmacist, CDE, CACP

## 2021-02-11 NOTE — PROGRESS NOTES
Discharged from [facility].  Secondary to non adherence/non compliance  Crystal Jiménez, Clinical Pharmacist, CDE, CACP

## 2021-03-08 ENCOUNTER — HOSPITAL ENCOUNTER (OUTPATIENT)
Dept: LAB | Facility: MEDICAL CENTER | Age: 71
End: 2021-03-08
Attending: NURSE PRACTITIONER
Payer: MEDICARE

## 2021-03-08 ENCOUNTER — OFFICE VISIT (OUTPATIENT)
Dept: MEDICAL GROUP | Facility: PHYSICIAN GROUP | Age: 71
End: 2021-03-08
Payer: MEDICARE

## 2021-03-08 ENCOUNTER — TELEPHONE (OUTPATIENT)
Dept: MEDICAL GROUP | Facility: PHYSICIAN GROUP | Age: 71
End: 2021-03-08

## 2021-03-08 VITALS
DIASTOLIC BLOOD PRESSURE: 74 MMHG | HEIGHT: 63 IN | OXYGEN SATURATION: 95 % | TEMPERATURE: 97.8 F | RESPIRATION RATE: 16 BRPM | HEART RATE: 91 BPM | WEIGHT: 209 LBS | BODY MASS INDEX: 37.03 KG/M2 | SYSTOLIC BLOOD PRESSURE: 134 MMHG

## 2021-03-08 DIAGNOSIS — M79.605 PAIN IN BOTH LOWER EXTREMITIES: ICD-10-CM

## 2021-03-08 DIAGNOSIS — F17.210 SMOKING GREATER THAN 40 PACK YEARS: ICD-10-CM

## 2021-03-08 DIAGNOSIS — M79.604 PAIN IN BOTH LOWER EXTREMITIES: ICD-10-CM

## 2021-03-08 DIAGNOSIS — I82.501 CHRONIC DEEP VEIN THROMBOSIS (DVT) OF RIGHT LOWER EXTREMITY, UNSPECIFIED VEIN (HCC): ICD-10-CM

## 2021-03-08 DIAGNOSIS — I27.82 CHRONIC PULMONARY EMBOLISM, UNSPECIFIED PULMONARY EMBOLISM TYPE, UNSPECIFIED WHETHER ACUTE COR PULMONALE PRESENT (HCC): ICD-10-CM

## 2021-03-08 DIAGNOSIS — Z72.0 TOBACCO ABUSE: ICD-10-CM

## 2021-03-08 DIAGNOSIS — I10 ESSENTIAL HYPERTENSION: ICD-10-CM

## 2021-03-08 DIAGNOSIS — Z13.21 ENCOUNTER FOR VITAMIN DEFICIENCY SCREENING: ICD-10-CM

## 2021-03-08 DIAGNOSIS — Z00.00 ENCOUNTER FOR HEALTH MAINTENANCE EXAMINATION IN ADULT: ICD-10-CM

## 2021-03-08 DIAGNOSIS — D68.2 FACTOR V DEFICIENCY (HCC): ICD-10-CM

## 2021-03-08 DIAGNOSIS — Z23 NEED FOR VACCINATION: ICD-10-CM

## 2021-03-08 DIAGNOSIS — E78.2 MIXED HYPERLIPIDEMIA: ICD-10-CM

## 2021-03-08 PROBLEM — R09.89 OTHER SPECIFIED SYMPTOMS AND SIGNS INVOLVING THE CIRCULATORY AND RESPIRATORY SYSTEMS: Status: RESOLVED | Noted: 2020-06-12 | Resolved: 2021-03-08

## 2021-03-08 PROBLEM — R06.01 ORTHOPNEA: Status: RESOLVED | Noted: 2020-06-12 | Resolved: 2021-03-08

## 2021-03-08 LAB
25(OH)D3 SERPL-MCNC: 42 NG/ML (ref 30–100)
ALBUMIN SERPL BCP-MCNC: 4.2 G/DL (ref 3.2–4.9)
ALBUMIN/GLOB SERPL: 1.3 G/DL
ALP SERPL-CCNC: 89 U/L (ref 30–99)
ALT SERPL-CCNC: 24 U/L (ref 2–50)
ANION GAP SERPL CALC-SCNC: 9 MMOL/L (ref 7–16)
APTT PPP: 56.3 SEC (ref 24.7–36)
AST SERPL-CCNC: 16 U/L (ref 12–45)
BILIRUB SERPL-MCNC: 0.3 MG/DL (ref 0.1–1.5)
BUN SERPL-MCNC: 16 MG/DL (ref 8–22)
CALCIUM SERPL-MCNC: 11.4 MG/DL (ref 8.5–10.5)
CHLORIDE SERPL-SCNC: 99 MMOL/L (ref 96–112)
CHOLEST SERPL-MCNC: 185 MG/DL (ref 100–199)
CO2 SERPL-SCNC: 28 MMOL/L (ref 20–33)
CREAT SERPL-MCNC: 0.76 MG/DL (ref 0.5–1.4)
ERYTHROCYTE [DISTWIDTH] IN BLOOD BY AUTOMATED COUNT: 50.2 FL (ref 35.9–50)
FASTING STATUS PATIENT QL REPORTED: NORMAL
GLOBULIN SER CALC-MCNC: 3.3 G/DL (ref 1.9–3.5)
GLUCOSE SERPL-MCNC: 101 MG/DL (ref 65–99)
HCT VFR BLD AUTO: 55.6 % (ref 37–47)
HDLC SERPL-MCNC: 46 MG/DL
HGB BLD-MCNC: 18.8 G/DL (ref 12–16)
INR PPP: 3.24 (ref 0.87–1.13)
LDLC SERPL CALC-MCNC: 111 MG/DL
MCH RBC QN AUTO: 30.9 PG (ref 27–33)
MCHC RBC AUTO-ENTMCNC: 33.8 G/DL (ref 33.6–35)
MCV RBC AUTO: 91.4 FL (ref 81.4–97.8)
PLATELET # BLD AUTO: 171 K/UL (ref 164–446)
PMV BLD AUTO: 12.3 FL (ref 9–12.9)
POTASSIUM SERPL-SCNC: 3.8 MMOL/L (ref 3.6–5.5)
PROT SERPL-MCNC: 7.5 G/DL (ref 6–8.2)
PROTHROMBIN TIME: 34 SEC (ref 12–14.6)
RBC # BLD AUTO: 6.08 M/UL (ref 4.2–5.4)
SODIUM SERPL-SCNC: 136 MMOL/L (ref 135–145)
TRIGL SERPL-MCNC: 139 MG/DL (ref 0–149)
TSH SERPL DL<=0.005 MIU/L-ACNC: 2.73 UIU/ML (ref 0.38–5.33)
WBC # BLD AUTO: 10.8 K/UL (ref 4.8–10.8)

## 2021-03-08 PROCEDURE — 85007 BL SMEAR W/DIFF WBC COUNT: CPT

## 2021-03-08 PROCEDURE — 84443 ASSAY THYROID STIM HORMONE: CPT

## 2021-03-08 PROCEDURE — 80061 LIPID PANEL: CPT

## 2021-03-08 PROCEDURE — 85730 THROMBOPLASTIN TIME PARTIAL: CPT

## 2021-03-08 PROCEDURE — 85027 COMPLETE CBC AUTOMATED: CPT

## 2021-03-08 PROCEDURE — 90732 PPSV23 VACC 2 YRS+ SUBQ/IM: CPT | Performed by: NURSE PRACTITIONER

## 2021-03-08 PROCEDURE — 82306 VITAMIN D 25 HYDROXY: CPT | Mod: GA

## 2021-03-08 PROCEDURE — 90662 IIV NO PRSV INCREASED AG IM: CPT | Performed by: NURSE PRACTITIONER

## 2021-03-08 PROCEDURE — 85610 PROTHROMBIN TIME: CPT

## 2021-03-08 PROCEDURE — 99205 OFFICE O/P NEW HI 60 MIN: CPT | Mod: 25 | Performed by: NURSE PRACTITIONER

## 2021-03-08 PROCEDURE — G0009 ADMIN PNEUMOCOCCAL VACCINE: HCPCS | Performed by: NURSE PRACTITIONER

## 2021-03-08 PROCEDURE — 36415 COLL VENOUS BLD VENIPUNCTURE: CPT

## 2021-03-08 PROCEDURE — 80053 COMPREHEN METABOLIC PANEL: CPT

## 2021-03-08 PROCEDURE — G0008 ADMIN INFLUENZA VIRUS VAC: HCPCS | Performed by: NURSE PRACTITIONER

## 2021-03-08 RX ORDER — GABAPENTIN 300 MG/1
300 CAPSULE ORAL NIGHTLY PRN
Qty: 90 CAPSULE | Refills: 3 | Status: SHIPPED | OUTPATIENT
Start: 2021-03-08 | End: 2022-03-28 | Stop reason: SDUPTHER

## 2021-03-08 RX ORDER — ROSUVASTATIN CALCIUM 5 MG/1
TABLET, COATED ORAL
COMMUNITY
Start: 2020-12-19 | End: 2021-06-07 | Stop reason: SDUPTHER

## 2021-03-08 ASSESSMENT — PATIENT HEALTH QUESTIONNAIRE - PHQ9: CLINICAL INTERPRETATION OF PHQ2 SCORE: 0

## 2021-03-08 ASSESSMENT — FIBROSIS 4 INDEX: FIB4 SCORE: 1.36

## 2021-03-08 NOTE — ASSESSMENT & PLAN NOTE
Onset: at least 5 years  Location: Lower extremities  Duration:  Quality: burning, cramping pain. She states her lateral thighs will occasionally go numb.   Modifying factors: If moving around pain is minimal. When lying down is when the pain gets worse.   Precipitating trauma: none  Associated symptoms: none  Home treatments: ibuprofen, heating pad on her back.

## 2021-03-08 NOTE — ASSESSMENT & PLAN NOTE
HTN - Chronic condition stable. Currently taking all meds as directed.   She is not taking baby aspirin daily.   She is monitoring BP at home. 1-2 times per month  Denies symptoms low BP: light-headed, tunnel-vision, unusual fatigue.   Denies symptoms high BP:pounding headache, visual changes, palpitations, flushed face.   Denies medicine side effects: unusual fatigue, slow heartbeat, foot/leg swelling, cough.

## 2021-03-08 NOTE — PROGRESS NOTES
CC: Patient here to establish care.  Patient has some concerns about her chronic anticoagulation.    HPI:  Trina presents with the following    Essential hypertension  HTN - Chronic condition stable. Currently taking all meds as directed.   She is not taking baby aspirin daily.   She is monitoring BP at home. 1-2 times per month  Denies symptoms low BP: light-headed, tunnel-vision, unusual fatigue.   Denies symptoms high BP:pounding headache, visual changes, palpitations, flushed face.   Denies medicine side effects: unusual fatigue, slow heartbeat, foot/leg swelling, cough.      Factor V deficiency (HCC)  Was diagnosed about age 62. States her daughter also has Factor 5 Leiden. She has had multiple DVTs and PEs. She is currently on warfarin, however, the testing supplies are expensive and she would like to switch to xarelto as she has been on this in the past.    Hyperlipidemia  Stable. Currently taking rosuvastatin 2.5mg as directed.  Denies side effects--no myalgias or abdominal pain.  She is not exercising regularly.  She is not taking ASA daily.  She denies dizziness, claudication, or chest pain.    When she was taking the 5 mg she was getting muscle cramps and myalgias.       Pain in both lower extremities    Onset: at least 5 years  Location: Lower extremities  Duration:  Quality: burning, cramping pain. She states her lateral thighs will occasionally go numb.   Modifying factors: If moving around pain is minimal. When lying down is when the pain gets worse.   Precipitating trauma: none  Associated symptoms: none  Home treatments: ibuprofen, heating pad on her back.       Pulmonary embolism (HCC)  This is a chronic condition. Patient developed this around age 62. Pt has factor V Leiden.   Patient is on chronic anticoagulation.  In the past she has been on Xarelto, at this time she has been taking warfarin.  Patient was doing home INR monitoring, however her machine broke.  She states that the testing  strips were also costing her around $100 a month.  Patient would like to switch back to Xarelto if possible.      Deep vein thrombosis (HCC)  This is a chronic issue.  Patient was first diagnosed around age 62.  When she developed her first DVT this is when she found out she had factor V Leiden.  Patient is on chronic anticoagulation for this.    Tobacco abuse  This is a chronic condition.  She smokes cigarettes.  Years used: 56 years  Packs/day: Right now she is down to about 1 pack/day.  In the past she was smoking close to 2 packs/day  Previously quit: She is in the process of trying to quit at this time.     Method used: Right now she is using the nicotine lozenges.  She states she has tried patches in the past without success.  Denies ever having been on medication to help quit.         Patient Active Problem List    Diagnosis Date Noted   • Essential hypertension 03/08/2021   • Deep vein thrombosis (HCC) 07/10/2020   • Pulmonary embolism ([facility]) 07/10/2020   • S/P coronary artery stent placement 06/26/2020   • Factor V deficiency (HCC) 06/26/2020   • Tobacco abuse 06/12/2020   • Hyperlipidemia 06/12/2020   • Pain in both lower extremities 06/12/2020       Current Outpatient Medications   Medication Sig Dispense Refill   • rosuvastatin (CRESTOR) 5 MG Tab take 1 tablet by mouth three times a week     • rivaroxaban (XARELTO) 20 MG Tab tablet Take 1 tablet by mouth with dinner. 90 tablet 3   • metoprolol SR (TOPROL XL) 25 MG TABLET SR 24 HR Take 1 Tab by mouth every day. 30 Tab 11   • clopidogrel (PLAVIX) 75 MG Tab Take 1 Tab by mouth every day. Start the day after you take the 300mg Plavix tab 30 Tab 10   • Calcium Carb-Cholecalciferol (CALCIUM 600+D) 600-800 MG-UNIT Tab Take  by mouth every day.     • MULTIPLE VITAMIN PO Take  by mouth every day.     • chlorthalidone (HYGROTON) 25 MG Tab every bedtime. TAKE 1 TABLET BY MOUTH DAILY     • lisinopril (PRINIVIL) 20 MG Tab 10 mg every bedtime. TAKE 1 TABLET BY MOUTH  DAILY     • Coenzyme Q10 (COQ10 PO) Take  by mouth. Takes 3 times per week       No current facility-administered medications for this visit.       Allergies as of 2021   • (No Known Allergies)        Social History     Socioeconomic History   • Marital status: Single     Spouse name: Not on file   • Number of children: Not on file   • Years of education: Not on file   • Highest education level: Not on file   Occupational History   • Not on file   Tobacco Use   • Smoking status: Current Every Day Smoker     Packs/day: 1.00     Years: 56.00     Pack years: 56.00     Types: Cigarettes   • Smokeless tobacco: Never Used   • Tobacco comment: Is trying to quit   Substance and Sexual Activity   • Alcohol use: Never   • Drug use: Never   • Sexual activity: Not on file   Other Topics Concern   • Not on file   Social History Narrative   • Not on file     Social Determinants of Health     Financial Resource Strain:    • Difficulty of Paying Living Expenses:    Food Insecurity:    • Worried About Running Out of Food in the Last Year:    • Ran Out of Food in the Last Year:    Transportation Needs:    • Lack of Transportation (Medical):    • Lack of Transportation (Non-Medical):    Physical Activity:    • Days of Exercise per Week:    • Minutes of Exercise per Session:    Stress:    • Feeling of Stress :    Social Connections:    • Frequency of Communication with Friends and Family:    • Frequency of Social Gatherings with Friends and Family:    • Attends Judaism Services:    • Active Member of Clubs or Organizations:    • Attends Club or Organization Meetings:    • Marital Status:    Intimate Partner Violence:    • Fear of Current or Ex-Partner:    • Emotionally Abused:    • Physically Abused:    • Sexually Abused:        Family History   Problem Relation Age of Onset   • Hypertension Mother    • Hyperlipidemia Mother    • Clotting Disorder Mother    • Heart Disease Father          before age 55   • Hypertension  Father    • Hyperlipidemia Father    • Heart Disease Paternal Uncle    • Hypertension Maternal Grandmother    • Hyperlipidemia Maternal Grandmother    • Diabetes Maternal Grandfather    • Hypertension Maternal Grandfather    • Hyperlipidemia Maternal Grandfather    • Heart Disease Paternal Grandmother    • Hypertension Paternal Grandmother    • Hyperlipidemia Paternal Grandmother    • Heart Disease Paternal Grandfather    • Hypertension Paternal Grandfather    • Hyperlipidemia Paternal Grandfather    • Stroke Paternal Grandfather    • Diabetes Son    • Clotting Disorder Daughter    • Lung Disease Neg Hx    • Cancer Neg Hx        Past Medical History:   Diagnosis Date   • Blood clotting disorder ([facility]) 2014    ankle, leg, lung   • Breath shortness     uses o2   • Dental disorder     upper   • DVT (deep venous thrombosis) ([facility])    • Heart burn    • High cholesterol    • Hypertension    • Indigestion    • Orthopnea 6/12/2020   • PE (pulmonary thromboembolism) ([facility])         Past Surgical History:   Procedure Laterality Date   • CARDIAC CATH, LEFT HEART     • OTHER      urethra as teenager       ROS:  Gen: no fevers/chills, no changes in weight  Pulm: no sob, no cough  CV: no chest pain, no palpitations  GI: no nausea/vomiting, no diarrhea  MSk: + myalgias  Neuro: no headaches, + numbness/tingling         Exam:   Vitals:    03/08/21 1353   BP: 134/74   Pulse: 91   Resp: 16   Temp: 36.6 °C (97.8 °F)   SpO2: 95%     Body mass index is 37.02 kg/m².    General: Normal appearing. No distress.  Head: Normocephalic.  Atraumatic.  Eyes: conjunctiva clear, pupils equal and reactive to light accommodation,  Ears: normal shape and contour, canals are clear bilaterally, tympanic membranes are benign  Throat: Oropharynx is without erythema, edema or exudates.   Neck: Supple without JVD or bruit.Thyroid is not enlarged.  Pulmonary: Clear to ausculation.  Normal effort. No rales, ronchi, or wheezing.  Cardiovascular: Regular rate and  rhythm without murmur. Carotid and radial pulses are intact and equal bilaterally.  Pedal pulses within normal limits.  Abdomen: Soft, nontender, nondistended. Normal bowel sounds. Liver and spleen are not palpable.  Neurologic: Grossly intact.  Sensation intact.  Lymph: No cervical or supraclavicular lymph nodes are palpable.  Skin: Warm and dry.  No obvious lesions.  Musculoskeletal: No extremity cyanosis, clubbing, or edema.  Strength 5+ and equal bilaterally in all extremities.  Psych: Normal mood and affect. Alert and oriented x3. Judgment and insight is normal.        Assessment and Plan.   71 y.o. female presenting with the following.     1. Factor V deficiency (HCC)  This is a chronic issue.  Patient is on chronic anticoagulation and will most likely be on chronic anticoagulation for life.  Patient is currently on warfarin, however, the at home testing supplies for her INR are quite expensive and patient has a limited income.  Patient would like to change over to Xarelto as she has been on this before.  Will place order for Xarelto 20 mg daily.  - rivaroxaban (XARELTO) 20 MG Tab tablet; Take 1 tablet by mouth with dinner.  Dispense: 90 tablet; Refill: 3    2. Chronic deep vein thrombosis (DVT) of right lower extremity, unspecified vein (HCC)  - rivaroxaban (XARELTO) 20 MG Tab tablet; Take 1 tablet by mouth with dinner.  Dispense: 90 tablet; Refill: 3  - APTT; Future  - Prothrombin Time; Future    3. Chronic pulmonary embolism, unspecified pulmonary embolism type, unspecified whether acute cor pulmonale present (HCC)  - rivaroxaban (XARELTO) 20 MG Tab tablet; Take 1 tablet by mouth with dinner.  Dispense: 90 tablet; Refill: 3  - APTT; Future  - Prothrombin Time; Future    These are chronic conditions for the patient.  Patient was diagnosed around age 62 with DVT and eventually PE.  Patient has been on warfarin chronically for several years.  She was provided with at home INR testing, however, this is quite  expensive for the test strips and her machine was lost/stolen.  She has been using her daughters as her daughter was also on Coumadin.  Will discontinue warfarin and order Xarelto 20 mg daily for chronic anticoagulation.    4. Essential hypertension  This is a chronic stable condition.  Patient does check her blood pressure at home occasionally.  She states it is never been above 140 systolic.  Continue chlorthalidone, metoprolol, and lisinopril.  - CBC WITHOUT DIFFERENTIAL; Future  - Comp Metabolic Panel; Future    5. Mixed hyperlipidemia  This is a chronic condition.  Patient only takes 2.5 mg of rosuvastatin as she was experiencing myalgias and muscle cramps.  I will continue this medication for now and obtain lipid panel.  If lipids uncontrolled, will send patient to pharmacotherapy management.  - Lipid Profile; Future    6. Smoking greater than 40 pack years  - REFERRAL TO PULMONARY AND SLEEP MEDICINE    7. Tobacco abuse  This is a chronic condition.  Patient is in the process of trying to quit.  She has been able to decrease her cigarette use to less than a pack to 1 pack daily.  Patient is using over-the-counter nicotine lozenges.  If patient is unsuccessful with only lozenges, will consider medication assisted therapy in the future.    8. Pain in both lower extremities  This is a chronic condition.  Patient states that this occurs at night after she lays down to bed.  She states that she will get burning sharp stabbing pains in her lower extremities.  Unsure at this time if this is due to restless leg syndrome or low back issues.  Will prescribe gabapentin 300 mg nightly to see if this will help with patient's pain.  We will also obtain a lumbar spine x-ray.    9. Encounter for vitamin deficiency screening  - VITAMIN D,25 HYDROXY; Future    10. Need for vaccination  - Influenza Vaccine, High Dose (65+ Only)  - PneumoVax PPV23 =>1yo    11. Encounter for health maintenance examination in adult  - TSH;  Future      Return in about 3 months (around 6/8/2021) for follow up.      I spent a total of 62 minutes with record review, exam, and communication with the patient, communication with other providers, and documentation of this encounter. This does not include time spent on separately billable procedures/tests.      I have placed the below orders and discussed them with an approved delegating provider.  The MA is performing the below orders under the direction of Dr. Mota.    Please note that this dictation was created using voice recognition software. I have worked with consultants from the vendor as well as technical experts from [facility] to optimize the interface. I have made every reasonable attempt to correct obvious errors, but I expect that there are errors of grammar and possibly content that I did not discover before finalizing the note.

## 2021-03-08 NOTE — ASSESSMENT & PLAN NOTE
Was diagnosed about age 62. States her daughter also has Factor 5 Leiden. She has had multiple DVTs and PEs. She is currently on warfarin, however, the testing supplies are expensive and she would like to switch to xarelto as she has been on this in the past.

## 2021-03-08 NOTE — ASSESSMENT & PLAN NOTE
This is a chronic condition. Patient developed this around age 62. Pt has factor V Leiden.   Patient is on chronic anticoagulation.  In the past she has been on Xarelto, at this time she has been taking warfarin.  Patient was doing home INR monitoring, however her machine broke.  She states that the testing strips were also costing her around $100 a month.  Patient would like to switch back to Xarelto if possible.

## 2021-03-08 NOTE — ASSESSMENT & PLAN NOTE
Stable. Currently taking rosuvastatin 2.5mg as directed.  Denies side effects--no myalgias or abdominal pain.  She is not exercising regularly.  She is not taking ASA daily.  She denies dizziness, claudication, or chest pain.    When she was taking the 5 mg she was getting muscle cramps and myalgias.

## 2021-03-08 NOTE — ASSESSMENT & PLAN NOTE
This is a chronic issue.  Patient was first diagnosed around age 62.  When she developed her first DVT this is when she found out she had factor V Leiden.  Patient is on chronic anticoagulation for this.

## 2021-03-08 NOTE — ASSESSMENT & PLAN NOTE
This is a chronic condition.  She smokes cigarettes.  Years used: 56 years  Packs/day: Right now she is down to about 1 pack/day.  In the past she was smoking close to 2 packs/day  Previously quit: She is in the process of trying to quit at this time.     Method used: Right now she is using the nicotine lozenges.  She states she has tried patches in the past without success.  Denies ever having been on medication to help quit.

## 2021-03-09 DIAGNOSIS — D58.2 ELEVATED HEMOGLOBIN (HCC): ICD-10-CM

## 2021-03-09 LAB
BASOPHILS # BLD AUTO: 0 % (ref 0–1.8)
BASOPHILS # BLD: 0 K/UL (ref 0–0.12)
EOSINOPHIL # BLD AUTO: 0.1 K/UL (ref 0–0.51)
EOSINOPHIL NFR BLD: 0.9 % (ref 0–6.9)
LYMPHOCYTES # BLD AUTO: 1.78 K/UL (ref 1–4.8)
LYMPHOCYTES NFR BLD: 16.5 % (ref 22–41)
MANUAL DIFF BLD: ABNORMAL
MONOCYTES # BLD AUTO: 0.56 K/UL (ref 0–0.85)
MONOCYTES NFR BLD AUTO: 5.2 % (ref 0–13.4)
NEUTROPHILS # BLD AUTO: 8.08 K/UL (ref 2–7.15)
NEUTROPHILS NFR BLD: 74.8 % (ref 44–72)
NEUTS BAND NFR BLD MANUAL: 2.6 % (ref 0–10)

## 2021-03-09 NOTE — TELEPHONE ENCOUNTER
Phone Number Called: [phone] (home)       Call outcome: Left detailed message for patient. Informed to call back with any additional questions.    Message: left detailed vm

## 2021-03-09 NOTE — TELEPHONE ENCOUNTER
----- Message from RUDY Roche sent at 3/8/2021  3:48 PM PST -----  Regarding: Medication and x-rays  Please call patient and let her know camilla I remembered what I wanted to x-ray.  I have placed orders for an x-ray of her lower back due to her leg pain at night.  I have also sent in the prescription for the gabapentin 1 capsule nightly to her pharmacy in [place].

## 2021-05-04 ENCOUNTER — APPOINTMENT (OUTPATIENT)
Dept: SLEEP MEDICINE | Facility: MEDICAL CENTER | Age: 71
End: 2021-05-04
Payer: MEDICARE

## 2021-05-23 DIAGNOSIS — I10 ESSENTIAL HYPERTENSION: ICD-10-CM

## 2021-05-24 RX ORDER — METOPROLOL SUCCINATE 25 MG/1
TABLET, EXTENDED RELEASE ORAL
Qty: 90 TABLET | Refills: 1 | Status: SHIPPED | OUTPATIENT
Start: 2021-05-24 | End: 2021-06-07 | Stop reason: SDUPTHER

## 2021-05-27 RX ORDER — CHLORTHALIDONE 25 MG/1
25 TABLET ORAL DAILY
Qty: 90 TABLET | Refills: 3 | Status: SHIPPED | OUTPATIENT
Start: 2021-05-27 | End: 2021-06-29

## 2021-06-07 ENCOUNTER — OFFICE VISIT (OUTPATIENT)
Dept: MEDICAL GROUP | Facility: PHYSICIAN GROUP | Age: 71
End: 2021-06-07
Payer: MEDICARE

## 2021-06-07 VITALS
TEMPERATURE: 97.6 F | WEIGHT: 205 LBS | HEIGHT: 63 IN | SYSTOLIC BLOOD PRESSURE: 124 MMHG | DIASTOLIC BLOOD PRESSURE: 70 MMHG | HEART RATE: 78 BPM | OXYGEN SATURATION: 93 % | BODY MASS INDEX: 36.32 KG/M2 | RESPIRATION RATE: 16 BRPM
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
## 2021-06-07 DIAGNOSIS — D58.2 ELEVATED HEMOGLOBIN (HCC): ICD-10-CM

## 2021-06-07 DIAGNOSIS — M79.605 PAIN IN BOTH LOWER EXTREMITIES: ICD-10-CM

## 2021-06-07 DIAGNOSIS — E78.2 MIXED HYPERLIPIDEMIA: ICD-10-CM

## 2021-06-07 DIAGNOSIS — M79.604 PAIN IN BOTH LOWER EXTREMITIES: ICD-10-CM

## 2021-06-07 DIAGNOSIS — I10 ESSENTIAL HYPERTENSION: ICD-10-CM

## 2021-06-07 PROCEDURE — 99214 OFFICE O/P EST MOD 30 MIN: CPT | Performed by: NURSE PRACTITIONER

## 2021-06-07 RX ORDER — ROSUVASTATIN CALCIUM 5 MG/1
5 TABLET, COATED ORAL DAILY
Qty: 90 TABLET | Refills: 3 | Status: SHIPPED | OUTPATIENT
Start: 2021-06-07 | End: 2022-03-28 | Stop reason: SDUPTHER

## 2021-06-07 RX ORDER — METOPROLOL SUCCINATE 25 MG/1
25 TABLET, EXTENDED RELEASE ORAL DAILY
Qty: 90 TABLET | Refills: 3 | Status: SHIPPED | OUTPATIENT
Start: 2021-06-07 | End: 2022-03-28 | Stop reason: SDUPTHER

## 2021-06-07 RX ORDER — LISINOPRIL 10 MG/1
10 TABLET ORAL DAILY
Qty: 90 TABLET | Refills: 3 | Status: SHIPPED | OUTPATIENT
Start: 2021-06-07 | End: 2022-03-28 | Stop reason: SDUPTHER

## 2021-06-07 ASSESSMENT — FIBROSIS 4 INDEX: FIB4 SCORE: 1.32

## 2021-06-07 NOTE — ASSESSMENT & PLAN NOTE
Pt fell about 2 weeks ago. Denies hitting her head. She has some scattered bruising on her arms and leg. She reports her right lower leg was swollen. It is better today. She also reports running out of her HCTZ around the same time.

## 2021-06-07 NOTE — PROGRESS NOTES
CC: follow up for back pain                                                                                                                                      HPI:   Trina presents today with the following.    Fall  Pt fell about 2 weeks ago. Denies hitting her head. She has some scattered bruising on her arms and leg. She reports her right lower leg was swollen. It is better today. She also reports running out of her HCTZ around the same time.     Pain in both lower extremities  Pt states since starting gabapentin at night her legs have been hurting less and she has been sleeping better.       Current Outpatient Medications   Medication Sig Dispense Refill   • metoprolol SR (TOPROL XL) 25 MG TABLET SR 24 HR Take 1 tablet by mouth every day. 90 tablet 3   • lisinopril (PRINIVIL) 10 MG Tab Take 1 tablet by mouth every day. 90 tablet 3   • rosuvastatin (CRESTOR) 5 MG Tab Take 1 tablet by mouth every day. 90 tablet 3   • clopidogrel (PLAVIX) 75 MG Tab Take 1 tablet by mouth every day. 90 tablet 0   • rivaroxaban (XARELTO) 20 MG Tab tablet Take 1 tablet by mouth with dinner. 90 tablet 3   • gabapentin (NEURONTIN) 300 MG Cap Take 1 capsule by mouth at bedtime as needed. 90 capsule 3   • Calcium Carb-Cholecalciferol (CALCIUM 600+D) 600-800 MG-UNIT Tab Take  by mouth every day.     • Coenzyme Q10 (COQ10 PO) Take  by mouth. Takes 3 times per week     • MULTIPLE VITAMIN PO Take  by mouth every day.     • chlorthalidone (HYGROTON) 25 MG Tab Take 1 tablet by mouth every day. (Patient not taking: Reported on 6/7/2021) 90 tablet 3     No current facility-administered medications for this visit.       Allergies as of 06/07/2021   • (No Known Allergies)        ROS:  Gen: no fevers/chills, no changes in weight  Pulm: + sob, no cough  CV: no chest pain, no palpitations  Neuro: no headaches, no numbness/tingling  Heme/Lymph: + easy bruising     /70 (BP Location: Left arm, Patient Position: Sitting, BP Cuff Size: Adult)  "  Pulse 78   Temp 36.4 °C (97.6 °F) (Temporal)   Resp 16   Ht 1.6 m (5' 3\")   Wt 93 kg (205 lb)   SpO2 93%   BMI 36.31 kg/m²     Physical Exam:  Gen:         Alert and oriented, No apparent distress.  Neck:        No Lymphadenopathy.   Lungs:     Clear to auscultation bilaterally. No wheezes, rales, or rhonchi.   CV:          Regular rate and rhythm. No murmurs, rubs or gallops.         Ext:          No clubbing, cyanosis, or peripheral edema.  Skin:  All visible skin intact without lesions.       Assessment and Plan:  71 y.o. female with the following issues.     1. Fall, initial encounter  Patient had fall 2 weeks ago.  She does still have some scattered bruising.  She was worried about her right lower extremity as this is where she had her DVTs previously.  Bruising has dissipated at this time.  Patient informed to contact me if swelling returns.    2. Pain in both lower extremities  This is a chronic stable condition.  This was keeping patient up at night.  We started gabapentin 300 mg nightly.  Patient states that this helps considerably continue gabapentin.    3. Essential hypertension  This is chronic condition.  Continue medication  - metoprolol SR (TOPROL XL) 25 MG TABLET SR 24 HR; Take 1 tablet by mouth every day.  Dispense: 90 tablet; Refill: 3  - lisinopril (PRINIVIL) 10 MG Tab; Take 1 tablet by mouth every day.  Dispense: 90 tablet; Refill: 3    4. Elevated hemoglobin (HCC)  This is a new finding 3 months ago on labs.  Will obtain follow-up CBC to see if this was delusional or if patient does have elevated hemoglobin.  - CBC WITHOUT DIFFERENTIAL; Future    5. Mixed hyperlipidemia  This is chronic condition.  Continue rosuvastatin.  - rosuvastatin (CRESTOR) 5 MG Tab; Take 1 tablet by mouth every day.  Dispense: 90 tablet; Refill: 3      Return in about 6 months (around 12/7/2021) for follow up .    I have placed the below orders and discussed them with an approved delegating provider.  The MA is " performing the below orders under the direction of Dr. rico.    Please note that this dictation was created using voice recognition software. I have worked with consultants from the vendor as well as technical experts from [facility] to optimize the interface. I have made every reasonable attempt to correct obvious errors, but I expect that there are errors of grammar and possibly content that I did not discover before finalizing the note.

## 2021-06-07 NOTE — ASSESSMENT & PLAN NOTE
Pt states since starting gabapentin at night her legs have been hurting less and she has been sleeping better.

## 2021-06-29 ENCOUNTER — HOSPITAL ENCOUNTER (OUTPATIENT)
Dept: LAB | Facility: MEDICAL CENTER | Age: 71
End: 2021-06-29
Attending: NURSE PRACTITIONER
Payer: MEDICARE

## 2021-06-29 ENCOUNTER — OFFICE VISIT (OUTPATIENT)
Dept: SLEEP MEDICINE | Facility: MEDICAL CENTER | Age: 71
End: 2021-06-29
Payer: MEDICARE

## 2021-06-29 VITALS
SYSTOLIC BLOOD PRESSURE: 110 MMHG | OXYGEN SATURATION: 92 % | WEIGHT: 208.31 LBS | BODY MASS INDEX: 35.56 KG/M2 | DIASTOLIC BLOOD PRESSURE: 64 MMHG | HEIGHT: 64 IN | HEART RATE: 102 BPM

## 2021-06-29 DIAGNOSIS — E66.9 CLASS 2 OBESITY WITH BODY MASS INDEX (BMI) OF 36.0 TO 36.9 IN ADULT, UNSPECIFIED OBESITY TYPE, UNSPECIFIED WHETHER SERIOUS COMORBIDITY PRESENT: ICD-10-CM

## 2021-06-29 DIAGNOSIS — Z72.0 TOBACCO ABUSE: ICD-10-CM

## 2021-06-29 DIAGNOSIS — I82.90 VTE (VENOUS THROMBOEMBOLISM): ICD-10-CM

## 2021-06-29 DIAGNOSIS — F17.210 NICOTINE DEPENDENCE, CIGARETTES, UNCOMPLICATED: ICD-10-CM

## 2021-06-29 DIAGNOSIS — D68.2 FACTOR V DEFICIENCY (HCC): ICD-10-CM

## 2021-06-29 DIAGNOSIS — D58.2 ELEVATED HEMOGLOBIN (HCC): ICD-10-CM

## 2021-06-29 LAB
ERYTHROCYTE [DISTWIDTH] IN BLOOD BY AUTOMATED COUNT: 49.6 FL (ref 35.9–50)
HCT VFR BLD AUTO: 49.1 % (ref 37–47)
HGB BLD-MCNC: 17 G/DL (ref 12–16)
MCH RBC QN AUTO: 32 PG (ref 27–33)
MCHC RBC AUTO-ENTMCNC: 34.6 G/DL (ref 33.6–35)
MCV RBC AUTO: 92.5 FL (ref 81.4–97.8)
PLATELET # BLD AUTO: 178 K/UL (ref 164–446)
PMV BLD AUTO: 12.5 FL (ref 9–12.9)
RBC # BLD AUTO: 5.31 M/UL (ref 4.2–5.4)
WBC # BLD AUTO: 10.7 K/UL (ref 4.8–10.8)

## 2021-06-29 PROCEDURE — 99204 OFFICE O/P NEW MOD 45 MIN: CPT | Performed by: INTERNAL MEDICINE

## 2021-06-29 PROCEDURE — 85027 COMPLETE CBC AUTOMATED: CPT

## 2021-06-29 ASSESSMENT — ENCOUNTER SYMPTOMS
BLURRED VISION: 0
WHEEZING: 0
DIAPHORESIS: 0
BACK PAIN: 0
DIARRHEA: 0
COUGH: 0
HEADACHES: 0
WEIGHT LOSS: 0
TREMORS: 0
NAUSEA: 0
EYE DISCHARGE: 0
SPEECH CHANGE: 0
FOCAL WEAKNESS: 0
PALPITATIONS: 0
SORE THROAT: 0
SHORTNESS OF BREATH: 0
CLAUDICATION: 0
HEMOPTYSIS: 0
PHOTOPHOBIA: 0
STRIDOR: 0
SPUTUM PRODUCTION: 0
CONSTIPATION: 0
DIZZINESS: 0
VOMITING: 0
CHILLS: 0
EYE REDNESS: 0
ORTHOPNEA: 0
NECK PAIN: 0
DOUBLE VISION: 0
DEPRESSION: 0
ABDOMINAL PAIN: 0
MYALGIAS: 0
HEARTBURN: 0
EYE PAIN: 0
FEVER: 0
PND: 0
SINUS PAIN: 0
WEAKNESS: 0
FALLS: 0

## 2021-06-29 ASSESSMENT — FIBROSIS 4 INDEX: FIB4 SCORE: 1.32

## 2021-06-29 NOTE — Clinical Note
Good afternoon all,  I saw Trina as a new patient clinic today.  It looks like the referral was supposed to be a lung cancer screening.  I did not want to waste her visit but she had no pulmonary complaints.  We obviously discussed tobacco cessation but I went ahead and ordered lung cancer screening CT and was going to count my visit as the decision making visit.  I just wanted to make sure she can see you all Isabella with the lung cancer screening program annually after the CT.  I placed the referral and also ordered her first CT.  Otherwise she does not have any complaints for me to address today  Jodi

## 2021-06-29 NOTE — PROGRESS NOTES
Chief Complaint   Patient presents with   • Establish Care     Referred by Cat TAYLOR for Lung Cancer       HPI: This patient is a 71 y.o. female presenting for lung cancer screening and I current tobacco user with greater than 50-pack-year history.  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  She also has dyslipidemia and hypertension.  She is a current tobacco smoker roughly 1 pack/day and has done so for the past 50 years.  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  No significant occupational exposures.  Family history is notable for father and paternal uncle who  of premature coronary artery disease.  The patient herself has no pulmonary specific complaints today.  She denies shortness of breath, chronic cough or wheezing.  She has never been treated for COPD or hospitalized for pulmonary issues.  It sounds as though the referral is mainly for lung cancer screening.    Past Medical History:   Diagnosis Date   • Blood clotting disorder ([facility]ly [facility])     ankle, leg, lung   • Breath shortness     uses o2   • Chickenpox    • Cough    • Dental disorder     upper   • DVT (deep venous thrombosis) ([facility])    • Urdu measles    • Heart burn    • High cholesterol    • Hypertension    • Indigestion    • Influenza    • Orthopnea 2020   • PE (pulmonary thromboembolism) ([facility])    • Shortness of breath        Social History     Socioeconomic History   • Marital status: Single     Spouse name: Not on file   • Number of children: Not on file   • Years of education: Not on file   • Highest education level: Not on file   Occupational History   • Not on file   Tobacco Use   • Smoking status: Current Every Day Smoker     Packs/day: 1.00     Years: 56.00     Pack years: 56.00     Types: Cigarettes   • Smokeless tobacco: Never Used   • Tobacco  comment: Is trying to quit   Vaping Use   • Vaping Use: Never used   Substance and Sexual Activity   • Alcohol use: Never   • Drug use: Never   • Sexual activity: Not on file   Other Topics Concern   • Not on file   Social History Narrative   • Not on file     Social Determinants of Health     Financial Resource Strain:    • Difficulty of Paying Living Expenses:    Food Insecurity:    • Worried About Running Out of Food in the Last Year:    • Ran Out of Food in the Last Year:    Transportation Needs:    • Lack of Transportation (Medical):    • Lack of Transportation (Non-Medical):    Physical Activity:    • Days of Exercise per Week:    • Minutes of Exercise per Session:    Stress:    • Feeling of Stress :    Social Connections:    • Frequency of Communication with Friends and Family:    • Frequency of Social Gatherings with Friends and Family:    • Attends [employer] Services:    • Active Member of Clubs or Organizations:    • Attends Club or Organization Meetings:    • Marital Status:    Intimate Partner Violence:    • Fear of Current or Ex-Partner:    • Emotionally Abused:    • Physically Abused:    • Sexually Abused:        Family History   Problem Relation Age of Onset   • Hypertension Mother    • Hyperlipidemia Mother    • Clotting Disorder Mother    • Heart Disease Father          before age 55   • Hypertension Father    • Hyperlipidemia Father    • Heart Disease Paternal Uncle    • Hypertension Maternal Grandmother    • Hyperlipidemia Maternal Grandmother    • Diabetes Maternal Grandfather    • Hypertension Maternal Grandfather    • Hyperlipidemia Maternal Grandfather    • Heart Disease Paternal Grandmother    • Hypertension Paternal Grandmother    • Hyperlipidemia Paternal Grandmother    • Heart Disease Paternal Grandfather    • Hypertension Paternal Grandfather    • Hyperlipidemia Paternal Grandfather    • Stroke Paternal Grandfather    • Diabetes Son    • Clotting Disorder Daughter    • Lung Disease  Neg Hx    • Cancer Neg Hx        Current Outpatient Medications on File Prior to Visit   Medication Sig Dispense Refill   • metoprolol SR (TOPROL XL) 25 MG TABLET SR 24 HR Take 1 tablet by mouth every day. 90 tablet 3   • lisinopril (PRINIVIL) 10 MG Tab Take 1 tablet by mouth every day. 90 tablet 3   • rosuvastatin (CRESTOR) 5 MG Tab Take 1 tablet by mouth every day. 90 tablet 3   • rivaroxaban (XARELTO) 20 MG Tab tablet Take 1 tablet by mouth with dinner. 90 tablet 3   • gabapentin (NEURONTIN) 300 MG Cap Take 1 capsule by mouth at bedtime as needed. 90 capsule 3   • Calcium Carb-Cholecalciferol (CALCIUM 600+D) 600-800 MG-UNIT Tab Take  by mouth every day.     • MULTIPLE VITAMIN PO Take  by mouth every day.       No current facility-administered medications on file prior to visit.       Allergies: Patient has no known allergies.    ROS:   Review of Systems   Constitutional: Negative for chills, diaphoresis, fever, malaise/fatigue and weight loss.   HENT: Negative for congestion, ear discharge, ear pain, hearing loss, nosebleeds, sinus pain, sore throat and tinnitus.    Eyes: Negative for blurred vision, double vision, photophobia, pain, discharge and redness.   Respiratory: Negative for cough, hemoptysis, sputum production, shortness of breath, wheezing and stridor.    Cardiovascular: Negative for chest pain, palpitations, orthopnea, claudication, leg swelling and PND.   Gastrointestinal: Negative for abdominal pain, constipation, diarrhea, heartburn, nausea and vomiting.   Genitourinary: Negative for dysuria and urgency.   Musculoskeletal: Negative for back pain, falls, joint pain, myalgias and neck pain.   Skin: Negative for itching and rash.   Neurological: Negative for dizziness, tremors, speech change, focal weakness, weakness and headaches.   Endo/Heme/Allergies: Negative for environmental allergies.   Psychiatric/Behavioral: Negative for depression.       /64 (BP Location: Right arm, Patient Position:  "Sitting, BP Cuff Size: Adult)   Pulse (!) 102   Ht 1.613 m (5' 3.5\")   Wt 94.5 kg (208 lb 5 oz)   SpO2 92%     Physical Exam:  Physical Exam  Vitals reviewed.   Constitutional:       General: She is not in acute distress.     Appearance: Normal appearance. She is well-developed. She is obese.   HENT:      Head: Normocephalic and atraumatic.      Right Ear: External ear normal.      Left Ear: External ear normal.      Nose: Nose normal. No congestion.      Mouth/Throat:      Mouth: Mucous membranes are moist.      Pharynx: Oropharynx is clear. No oropharyngeal exudate.   Eyes:      General: No scleral icterus.     Extraocular Movements: Extraocular movements intact.      Conjunctiva/sclera: Conjunctivae normal.      Pupils: Pupils are equal, round, and reactive to light.   Neck:      Vascular: No JVD.      Trachea: No tracheal deviation.   Cardiovascular:      Rate and Rhythm: Normal rate and regular rhythm.      Heart sounds: Normal heart sounds. No murmur heard.   No friction rub. No gallop.    Pulmonary:      Effort: Pulmonary effort is normal. No accessory muscle usage or respiratory distress.      Breath sounds: Normal breath sounds. No wheezing or rales.   Abdominal:      General: There is no distension.      Palpations: Abdomen is soft.      Tenderness: There is no abdominal tenderness.   Musculoskeletal:         General: No tenderness or deformity. Normal range of motion.      Cervical back: Normal range of motion and neck supple.      Right lower leg: No edema.      Left lower leg: No edema.   Lymphadenopathy:      Cervical: No cervical adenopathy.   Skin:     General: Skin is warm and dry.      Findings: No rash.      Nails: There is no clubbing.   Neurological:      Mental Status: She is alert and oriented to person, place, and time.      Cranial Nerves: No cranial nerve deficit.      Gait: Gait normal.   Psychiatric:         Mood and Affect: Mood normal.         Behavior: Behavior normal.         PFTs " as reviewed by me personally: none    Imaging as reviewed by me personally: none    Assessment:  1. Tobacco abuse  REFERRAL TO LUNG CANCER SCREENING PROGRAM    CT-LUNG CANCER-SCREENING   2. Nicotine dependence, cigarettes, uncomplicated   REFERRAL TO LUNG CANCER SCREENING PROGRAM    CT-LUNG CANCER-SCREENING   3. Factor V deficiency (HCC)     4. VTE (venous thromboembolism)     5. Class 2 obesity with body mass index (BMI) of 36.0 to 36.9 in adult, unspecified obesity type, unspecified whether serious comorbidity present         Plan:  1.  Patient was counseled on tobacco cessation.  She is aware of ongoing risks of continued use.  She is actively trying to cut back.  She is a candidate for lung cancer screening.  I believe that is the main reason for her referral in which case a better place for her would be a lung cancer screening program.  We did walk through shared decision making and discussed the risks and benefits of annual low-dose CT chest for the early detection of asymptomatic lung cancer.  She is over the age of 55 and under 77 with greater than 30-pack-year history therefore meets criteria for lung cancer screening by previous standards as well as updated standards.  She is agreeable to start screening with low-dose CT chest.  I have ordered CT chest and will have her follow-up from here on out on an annual basis for lung cancer screening program.  2.  See discussion above.  3.  On lifelong anticoagulation.  4.  Historic in the setting of factor V Leyden.  On lifelong anticoagulation.  5.  This does put patient at increased risk for obesity related pulmonary complications.  Encouraged healthy lifestyle habits.  Return if symptoms worsen or fail to improve.

## 2021-06-30 ENCOUNTER — TELEPHONE (OUTPATIENT)
Dept: HEMATOLOGY ONCOLOGY | Facility: MEDICAL CENTER | Age: 71
End: 2021-06-30

## 2021-06-30 NOTE — TELEPHONE ENCOUNTER
DIEGO Scales.  Elisabeth Phillips M.D.; GABRIELLE Roche.  Cc: Camryn Camargo; Hanna Lozada R.N.  Hi Dr. Phillips,     Thank you for the heads up. Glad that you were able to complete the shared decision making visit with the patient and get her scheduled for the lung cancer screening CT.   I have cc'd our  for the program to note that she does not need to schedule the patient for an official visit in our clinic. Placing the referral will track her in the program. No matter what the results of the CT is, she will not need to see me. She will need to follow up with her PCP in 1 year for continued annual screening, and if there is a nodule that needs monitoring, then that would go through you which makes it nice that she established with you for continued monitoring.     I have cc'd the PCP on this as well so she is aware that if she intends patients to be seen for lung cancer screening she can place the lung cancer screening program referral and not pulmonary referral.     Thank you for the heads up and glad that you were able to complete the shared decision making visit with the patient.     Regards,   Isabella

## 2021-08-13 ENCOUNTER — TELEPHONE (OUTPATIENT)
Dept: SLEEP MEDICINE | Facility: MEDICAL CENTER | Age: 71
End: 2021-08-13

## 2021-08-13 NOTE — TELEPHONE ENCOUNTER
Patient left voice message needing a refill on her medication CHLORTHALIDONE which was not prescribed by our pulmonary office. Patient read me the label on her medication bottle and it was ordered by her cardiologist Dr Víctor Galvan.     Patient aware she needs to call her cardiology for refills. Patient in agreement.

## 2021-08-16 RX ORDER — CHLORTHALIDONE 25 MG/1
TABLET ORAL
Qty: 90 TABLET | OUTPATIENT
Start: 2021-08-16

## 2021-08-16 RX ORDER — CHLORTHALIDONE 25 MG/1
25 TABLET ORAL DAILY
Qty: 90 TABLET | Refills: 3 | Status: SHIPPED | OUTPATIENT
Start: 2021-08-16 | End: 2022-03-28 | Stop reason: SDUPTHER

## 2021-08-16 NOTE — TELEPHONE ENCOUNTER
Cat - I see Pulmonary d/c'd this med 6/29. No discussion or reason listed.  Please refill as you see fit.

## 2021-12-06 ENCOUNTER — APPOINTMENT (OUTPATIENT)
Dept: MEDICAL GROUP | Facility: PHYSICIAN GROUP | Age: 71
End: 2021-12-06
Payer: MEDICARE

## 2022-03-28 ENCOUNTER — OFFICE VISIT (OUTPATIENT)
Dept: MEDICAL GROUP | Facility: PHYSICIAN GROUP | Age: 72
End: 2022-03-28
Payer: MEDICARE

## 2022-03-28 VITALS
WEIGHT: 207 LBS | HEIGHT: 63 IN | BODY MASS INDEX: 36.68 KG/M2 | OXYGEN SATURATION: 90 % | HEART RATE: 110 BPM | TEMPERATURE: 97.4 F | DIASTOLIC BLOOD PRESSURE: 70 MMHG | SYSTOLIC BLOOD PRESSURE: 140 MMHG | RESPIRATION RATE: 16 BRPM

## 2022-03-28 DIAGNOSIS — Z83.3 FAMILY HISTORY OF DIABETES MELLITUS: ICD-10-CM

## 2022-03-28 DIAGNOSIS — I10 ESSENTIAL HYPERTENSION: ICD-10-CM

## 2022-03-28 DIAGNOSIS — E78.2 MIXED HYPERLIPIDEMIA: ICD-10-CM

## 2022-03-28 DIAGNOSIS — M79.604 PAIN IN BOTH LOWER EXTREMITIES: ICD-10-CM

## 2022-03-28 DIAGNOSIS — K59.01 SLOW TRANSIT CONSTIPATION: ICD-10-CM

## 2022-03-28 DIAGNOSIS — I27.82 CHRONIC PULMONARY EMBOLISM, UNSPECIFIED PULMONARY EMBOLISM TYPE, UNSPECIFIED WHETHER ACUTE COR PULMONALE PRESENT (HCC): ICD-10-CM

## 2022-03-28 DIAGNOSIS — E55.9 VITAMIN D DEFICIENCY: ICD-10-CM

## 2022-03-28 DIAGNOSIS — I82.501 CHRONIC DEEP VEIN THROMBOSIS (DVT) OF RIGHT LOWER EXTREMITY, UNSPECIFIED VEIN (HCC): ICD-10-CM

## 2022-03-28 DIAGNOSIS — M79.605 PAIN IN BOTH LOWER EXTREMITIES: ICD-10-CM

## 2022-03-28 DIAGNOSIS — D68.2 FACTOR V DEFICIENCY (HCC): ICD-10-CM

## 2022-03-28 DIAGNOSIS — Z13.29 SCREENING FOR THYROID DISORDER: ICD-10-CM

## 2022-03-28 PROCEDURE — 99214 OFFICE O/P EST MOD 30 MIN: CPT | Performed by: NURSE PRACTITIONER

## 2022-03-28 RX ORDER — GABAPENTIN 300 MG/1
300-600 CAPSULE ORAL NIGHTLY PRN
Qty: 120 CAPSULE | Refills: 3 | Status: SHIPPED | OUTPATIENT
Start: 2022-03-28 | End: 2022-09-27 | Stop reason: SDUPTHER

## 2022-03-28 RX ORDER — ROSUVASTATIN CALCIUM 5 MG/1
5 TABLET, COATED ORAL DAILY
Qty: 90 TABLET | Refills: 3 | Status: SHIPPED | OUTPATIENT
Start: 2022-03-28 | End: 2023-02-04 | Stop reason: SDUPTHER

## 2022-03-28 RX ORDER — POLYETHYLENE GLYCOL 3350 17 G/17G
17 POWDER, FOR SOLUTION ORAL DAILY
Qty: 850 G | Refills: 1 | Status: SHIPPED | OUTPATIENT
Start: 2022-03-28 | End: 2022-06-26

## 2022-03-28 RX ORDER — LISINOPRIL 10 MG/1
10 TABLET ORAL DAILY
Qty: 90 TABLET | Refills: 3 | Status: SHIPPED | OUTPATIENT
Start: 2022-03-28 | End: 2023-02-04 | Stop reason: SDUPTHER

## 2022-03-28 RX ORDER — METOPROLOL SUCCINATE 25 MG/1
25 TABLET, EXTENDED RELEASE ORAL DAILY
Qty: 90 TABLET | Refills: 3 | Status: SHIPPED | OUTPATIENT
Start: 2022-03-28 | End: 2023-02-04 | Stop reason: SDUPTHER

## 2022-03-28 RX ORDER — CHLORTHALIDONE 25 MG/1
25 TABLET ORAL DAILY
Qty: 90 TABLET | Refills: 3 | Status: SHIPPED | OUTPATIENT
Start: 2022-03-28 | End: 2023-02-04 | Stop reason: SDUPTHER

## 2022-03-28 ASSESSMENT — PATIENT HEALTH QUESTIONNAIRE - PHQ9: CLINICAL INTERPRETATION OF PHQ2 SCORE: 0

## 2022-03-28 ASSESSMENT — FIBROSIS 4 INDEX: FIB4 SCORE: 1.28

## 2022-03-28 NOTE — ASSESSMENT & PLAN NOTE
4/29/2020       Pennie [facility]  Update  Update [place] [address]      Dear Pennie,    We are sorry we missed you on 4/23/2020. Our records indicate that you were unable to keep a scheduled appointment with me.    Your health care is important to us.  Please call our office at [phone] at your earliest convenience to reschedule your appointment or to inform us of any possible scheduling errors.    We want to take care of the healthcare needs of all of our patients.  Canceling an appointment with appropriate notice allows other patients the opportunity to be seen.  A missed appointment translates into a time unavailable to other patients.Our office charges $50.00 per failed appointment.    [facility] requests that patients notify the appointment desk of cancellations as early as possible prior to a scheduled appointment time.    We look forward to hearing from you soon.      Sincerely,          Dr. Kym Kumar  [facility] [place]  [address], [address]  [place], [place] [id number]   This is a chronic stable condition. Patient has been taking gabapentin at night to help with her chronci leg pain. She reports that this is very effective at helping her sleep.     Continue gabapentin.

## 2022-03-28 NOTE — ASSESSMENT & PLAN NOTE
This is a chronic condition. Patient has a history of DVT and PE. She is on xarelto for anticoagulation.     Patient is stable on xarelto.     Continue xarelto.

## 2022-03-28 NOTE — PROGRESS NOTES
"  CC: follow up for chronic health condition                                                                                                                                   HPI:    Trina presents today with the following.    Problem   Slow Transit Constipation   Essential Hypertension   Factor V Deficiency (Hcc)   Hyperlipidemia   Pain in Both Lower Extremities       Current Outpatient Medications   Medication Sig Dispense Refill   • rivaroxaban (XARELTO) 20 MG Tab tablet Take 1 Tablet by mouth with dinner. 90 Tablet 3   • rosuvastatin (CRESTOR) 5 MG Tab Take 1 Tablet by mouth every day. 90 Tablet 3   • metoprolol SR (TOPROL XL) 25 MG TABLET SR 24 HR Take 1 Tablet by mouth every day. 90 Tablet 3   • lisinopril (PRINIVIL) 10 MG Tab Take 1 Tablet by mouth every day. 90 Tablet 3   • gabapentin (NEURONTIN) 300 MG Cap Take 1-2 Capsules by mouth at bedtime as needed. 120 Capsule 3   • chlorthalidone (HYGROTON) 25 MG Tab Take 1 Tablet by mouth every day. 90 Tablet 3   • polyethylene glycol 3350 (MIRALAX) 17 GM/SCOOP Powder Take 17 g by mouth every day for 90 days. 850 g 1   • Calcium Carb-Cholecalciferol (CALCIUM 600+D) 600-800 MG-UNIT Tab Take  by mouth every day.     • MULTIPLE VITAMIN PO Take  by mouth every day.       No current facility-administered medications for this visit.       Allergies as of 03/28/2022   • (No Known Allergies)        ROS:  All systems negative expect as addressed in assessment and plan.     /70 (BP Location: Left arm, Patient Position: Sitting, BP Cuff Size: Adult)   Pulse (!) 110   Temp 36.3 °C (97.4 °F) (Temporal)   Resp 16   Ht 1.6 m (5' 3\")   Wt 93.9 kg (207 lb)   SpO2 90%   BMI 36.67 kg/m²     Physical Exam:  Gen:         Alert and oriented, No apparent distress.  Neck:        No Lymphadenopathy.   Lungs:     Clear to auscultation bilaterally. No wheezes, rales, or rhonchi.   CV:          Regular rate and rhythm. No murmurs, rubs or gallops.         Ext:          No " clubbing, cyanosis, or peripheral edema.  Skin:  All visible skin intact without lesions.       Assessment and Plan:  72 y.o. female with the following issues.    1. Factor V deficiency (HCC)  rivaroxaban (XARELTO) 20 MG Tab tablet    CBC WITHOUT DIFFERENTIAL    VITAMIN B12   2. Chronic deep vein thrombosis (DVT) of right lower extremity, unspecified vein (HCC)  rivaroxaban (XARELTO) 20 MG Tab tablet    CBC WITHOUT DIFFERENTIAL    VITAMIN B12   3. Chronic pulmonary embolism, unspecified pulmonary embolism type, unspecified whether acute cor pulmonale present (HCC)  rivaroxaban (XARELTO) 20 MG Tab tablet    CBC WITHOUT DIFFERENTIAL    VITAMIN B12   4. Mixed hyperlipidemia  rosuvastatin (CRESTOR) 5 MG Tab    Lipid Profile    CBC WITHOUT DIFFERENTIAL   5. Essential hypertension  metoprolol SR (TOPROL XL) 25 MG TABLET SR 24 HR    lisinopril (PRINIVIL) 10 MG Tab    chlorthalidone (HYGROTON) 25 MG Tab    CBC WITHOUT DIFFERENTIAL    Comp Metabolic Panel   6. Pain in both lower extremities  gabapentin (NEURONTIN) 300 MG Cap    VITAMIN B12   7. Slow transit constipation  Comp Metabolic Panel   8. Screening for thyroid disorder  TSH    FREE THYROXINE   9. Vitamin D deficiency  VITAMIN D,25 HYDROXY   10. Family history of diabetes mellitus  Comp Metabolic Panel    HEMOGLOBIN A1C        Pain in both lower extremities  This is a chronic stable condition. Patient has been taking gabapentin at night to help with her chronci leg pain. She reports that this is very effective at helping her sleep.     Continue gabapentin.     Slow transit constipation  This is a chronic issue. Patient has been using laxatives and occasional enemas to relieve this. She reports that she sometimes will go a week or longer without a BM. She usually only takes the laxative if it has been longer than 3 days without a BM.     Will prescribe miralax to help with this.     Factor V deficiency (HCC)  This is a chronic condition. Patient has a history of DVT  and PE. She is on xarelto for anticoagulation.     Patient is stable on xarelto.     Continue xarelto.     Hyperlipidemia  This is a chronic stable condition. Patient is on rosuvastatin.     Continue rosuvastatin.     Essential hypertension  This is a chronic stable condition. She is stable on her BP medication.     Continue medication.       Return in about 6 months (around 9/28/2022) for follow up for chronic health condition.    I have placed the below orders and discussed them with an approved delegating provider.  The MA is performing the below orders under the direction of Dr Garcia.    Please note that this dictation was created using voice recognition software. I have worked with consultants from the vendor as well as technical experts from [facility] [employer] to optimize the interface. I have made every reasonable attempt to correct obvious errors, but I expect that there are errors of grammar and possibly content that I did not discover before finalizing the note.

## 2022-09-27 ENCOUNTER — OFFICE VISIT (OUTPATIENT)
Dept: MEDICAL GROUP | Facility: PHYSICIAN GROUP | Age: 72
End: 2022-09-27
Payer: MEDICARE

## 2022-09-27 VITALS
DIASTOLIC BLOOD PRESSURE: 84 MMHG | WEIGHT: 214.1 LBS | HEART RATE: 91 BPM | TEMPERATURE: 97.6 F | RESPIRATION RATE: 16 BRPM | OXYGEN SATURATION: 92 % | SYSTOLIC BLOOD PRESSURE: 118 MMHG | BODY MASS INDEX: 37.93 KG/M2 | HEIGHT: 63 IN

## 2022-09-27 DIAGNOSIS — Z23 NEED FOR VACCINATION: ICD-10-CM

## 2022-09-27 DIAGNOSIS — M79.604 PAIN IN BOTH LOWER EXTREMITIES: ICD-10-CM

## 2022-09-27 DIAGNOSIS — I82.4Y9 DEEP VEIN THROMBOSIS (DVT) OF PROXIMAL LOWER EXTREMITY, UNSPECIFIED CHRONICITY, UNSPECIFIED LATERALITY (HCC): ICD-10-CM

## 2022-09-27 DIAGNOSIS — M79.605 PAIN IN BOTH LOWER EXTREMITIES: ICD-10-CM

## 2022-09-27 DIAGNOSIS — E78.2 MIXED HYPERLIPIDEMIA: ICD-10-CM

## 2022-09-27 DIAGNOSIS — G25.81 RESTLESS LEG SYNDROME: ICD-10-CM

## 2022-09-27 DIAGNOSIS — D68.2 FACTOR V DEFICIENCY (HCC): ICD-10-CM

## 2022-09-27 DIAGNOSIS — I10 ESSENTIAL HYPERTENSION: ICD-10-CM

## 2022-09-27 DIAGNOSIS — D68.69 HYPERCOAGULABLE STATE, SECONDARY (HCC): ICD-10-CM

## 2022-09-27 DIAGNOSIS — I25.118 ATHSCL HEART DISEASE OF NATIVE COR ART W OTH ANG PCTRS (HCC): ICD-10-CM

## 2022-09-27 PROCEDURE — 99214 OFFICE O/P EST MOD 30 MIN: CPT | Mod: 25 | Performed by: NURSE PRACTITIONER

## 2022-09-27 PROCEDURE — G0008 ADMIN INFLUENZA VIRUS VAC: HCPCS | Performed by: NURSE PRACTITIONER

## 2022-09-27 PROCEDURE — 90662 IIV NO PRSV INCREASED AG IM: CPT | Performed by: NURSE PRACTITIONER

## 2022-09-27 RX ORDER — GABAPENTIN 300 MG/1
300-600 CAPSULE ORAL NIGHTLY PRN
Qty: 120 CAPSULE | Refills: 3 | Status: SHIPPED | OUTPATIENT
Start: 2022-09-27 | End: 2023-02-04 | Stop reason: SDUPTHER

## 2022-09-27 ASSESSMENT — FIBROSIS 4 INDEX: FIB4 SCORE: 1.28

## 2022-09-27 NOTE — ASSESSMENT & PLAN NOTE
This is a chronic stable condition. Patient is stable on lisinopril and chlorthalidone.     Continue medications.

## 2022-09-27 NOTE — ASSESSMENT & PLAN NOTE
This is a chronic stable condition. Patient uses 300-600 mg of gabapentin at night to help with her restless leg.     Continue gabapentin.

## 2022-09-27 NOTE — ASSESSMENT & PLAN NOTE
This is a chronic condition. Patient has Factor V Leiden. She is on anticoagulation. She also wears compression socks.     Continue anticoagulation.

## 2022-09-27 NOTE — PROGRESS NOTES
"  CC: Follow up for chronic health conditions                                                                                                                                      HPI:   Trina presents today with the following.    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 120 Capsule 3    rivaroxaban (XARELTO) 20 MG Tab tablet Take 1 Tablet by mouth with dinner. 90 Tablet 3    rosuvastatin (CRESTOR) 5 MG Tab Take 1 Tablet by mouth every day. 90 Tablet 3    metoprolol SR (TOPROL XL) 25 MG TABLET SR 24 HR Take 1 Tablet by mouth every day. 90 Tablet 3    lisinopril (PRINIVIL) 10 MG Tab Take 1 Tablet by mouth every day. 90 Tablet 3    chlorthalidone (HYGROTON) 25 MG Tab Take 1 Tablet by mouth every day. 90 Tablet 3    Calcium Carb-Cholecalciferol (CALCIUM 600+D) 600-800 MG-UNIT Tab Take  by mouth every day.      MULTIPLE VITAMIN PO Take  by mouth every day.       No current facility-administered medications for this visit.       Allergies as of 09/27/2022    (No Known Allergies)        ROS:  All systems negative expect as addressed in assessment and plan.     /84 (BP Location: Left arm, Patient Position: Sitting, BP Cuff Size: Adult)   Pulse 91   Temp 36.4 °C (97.6 °F) (Temporal)   Resp 16   Ht 1.6 m (5' 3\")   Wt 97.1 kg (214 lb 1.6 oz)   SpO2 92%   BMI 37.93 kg/m²     Physical Exam:  Gen:         Alert and oriented, No apparent distress.  Neck:        No Lymphadenopathy.   Lungs:     Clear to auscultation bilaterally. No wheezes, rales, or rhonchi.   CV:          Regular rate and rhythm. No murmurs, rubs or gallops.         Ext:          No " clubbing, cyanosis, or peripheral edema.  Skin:  All visible skin intact without lesions.       Assessment and Plan:  72 y.o. female with the following issues.    1. Need for vaccination  INFLUENZA VACCINE, HIGH DOSE (65+ ONLY)      2. Pain in both lower extremities  gabapentin (NEURONTIN) 300 MG Cap      3. Restless leg syndrome        4. Factor V deficiency (HCC)        5. Essential hypertension        6. Mixed hyperlipidemia        7. Deep vein thrombosis (DVT) of proximal lower extremity, unspecified chronicity, unspecified laterality (HCC)        8. 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 Hypercoagulable state, secondary (HCC)             Factor V deficiency (HCC)  This is a chronic condition. Patient has history of chronic DVT and PE due to this. She is on xarelto.     Continue xarelto.     Essential hypertension  This is a chronic stable condition. Patient is stable on lisinopril and chlorthalidone.     Continue medications.       Hyperlipidemia  This is a chronic stable condition. Patient is stable on rosuvastatin.     Continue rosuvastatin.     Restless leg syndrome  This is a chronic stable condition. Patient uses 300-600 mg of gabapentin at night to help with her restless leg.     Continue gabapentin.     Deep vein thrombosis (HCC)  This is a chronic condition. Patient has Factor V Leiden. She is on anticoagulation. She also wears compression socks.     Continue anticoagulation.     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 Patient follows with cardiology and has had a cath in 2020. Patient is on rosuvastatin.     ASA is contraindicated as patient is on xarelto for DVT.     Continue following with cardiology.       Hypercoagulable state, secondary (HCC)  This is a chronic stable condition. Patient is stable on xarelto due to DVT and PE. Patient denies any abnormal bleeding.     Continue xarelto.       Return in about 6  months (around 3/27/2023) for follow up for chronic health conditions.      I have placed the below orders and discussed them with an approved delegating provider.  The MA is performing the below orders under the direction of Dr. neely.    Please note that this dictation was created using voice recognition software. I have worked with consultants from the vendor as well as technical experts from [facility] to optimize the interface. I have made every reasonable attempt to correct obvious errors, but I expect that there are errors of grammar and possibly content that I did not discover before finalizing the note.

## 2022-09-27 NOTE — ASSESSMENT & PLAN NOTE
This is a chronic stable condition. Patient is stable on rosuvastatin.     Continue rosuvastatin.

## 2022-09-27 NOTE — ASSESSMENT & PLAN NOTE
This is a chronic condition. Patient has history of chronic DVT and PE due to this. She is on xarelto.     Continue xarelto.

## 2022-10-06 PROBLEM — D68.69 HYPERCOAGULABLE STATE, SECONDARY (HCC): Status: ACTIVE | Noted: 2022-10-06

## 2022-10-06 PROBLEM — I25.118 ATHSCL HEART DISEASE OF NATIVE COR ART W OTH ANG PCTRS (HCC): Status: ACTIVE | Noted: 2022-10-06

## 2022-10-07 NOTE — ASSESSMENT & PLAN NOTE
This is a chronic stable condition. Patient follows with cardiology and has had a cath in 2020. Patient is on rosuvastatin.     ASA is contraindicated as patient is on xarelto for DVT.     Continue following with cardiology.

## 2022-10-07 NOTE — ASSESSMENT & PLAN NOTE
This is a chronic stable condition. Patient is stable on xarelto due to DVT and PE. Patient denies any abnormal bleeding.     Continue xarelto.

## 2023-02-04 DIAGNOSIS — I10 ESSENTIAL HYPERTENSION: ICD-10-CM

## 2023-02-04 DIAGNOSIS — E78.2 MIXED HYPERLIPIDEMIA: ICD-10-CM

## 2023-02-04 DIAGNOSIS — I27.82 CHRONIC PULMONARY EMBOLISM, UNSPECIFIED PULMONARY EMBOLISM TYPE, UNSPECIFIED WHETHER ACUTE COR PULMONALE PRESENT (HCC): ICD-10-CM

## 2023-02-04 DIAGNOSIS — D68.2 FACTOR V DEFICIENCY (HCC): ICD-10-CM

## 2023-02-04 DIAGNOSIS — M79.605 PAIN IN BOTH LOWER EXTREMITIES: ICD-10-CM

## 2023-02-04 DIAGNOSIS — M79.604 PAIN IN BOTH LOWER EXTREMITIES: ICD-10-CM

## 2023-02-04 DIAGNOSIS — I82.501 CHRONIC DEEP VEIN THROMBOSIS (DVT) OF RIGHT LOWER EXTREMITY, UNSPECIFIED VEIN (HCC): ICD-10-CM

## 2023-02-08 RX ORDER — CHLORTHALIDONE 25 MG/1
25 TABLET ORAL DAILY
Qty: 90 TABLET | Refills: 3 | Status: SHIPPED | OUTPATIENT
Start: 2023-02-08 | End: 2023-11-09 | Stop reason: SDUPTHER

## 2023-02-08 RX ORDER — METOPROLOL SUCCINATE 25 MG/1
25 TABLET, EXTENDED RELEASE ORAL DAILY
Qty: 90 TABLET | Refills: 3 | Status: SHIPPED | OUTPATIENT
Start: 2023-02-08 | End: 2023-11-09 | Stop reason: SDUPTHER

## 2023-02-08 RX ORDER — GABAPENTIN 300 MG/1
300-600 CAPSULE ORAL NIGHTLY PRN
Qty: 120 CAPSULE | Refills: 3 | Status: SHIPPED | OUTPATIENT
Start: 2023-02-08 | End: 2023-09-22

## 2023-02-08 RX ORDER — ROSUVASTATIN CALCIUM 5 MG/1
5 TABLET, COATED ORAL DAILY
Qty: 90 TABLET | Refills: 3 | Status: SHIPPED | OUTPATIENT
Start: 2023-02-08 | End: 2023-11-09 | Stop reason: SDUPTHER

## 2023-02-08 RX ORDER — LISINOPRIL 10 MG/1
10 TABLET ORAL DAILY
Qty: 90 TABLET | Refills: 3 | Status: SHIPPED | OUTPATIENT
Start: 2023-02-08 | End: 2023-11-09 | Stop reason: SDUPTHER

## 2023-04-14 ENCOUNTER — TELEPHONE (OUTPATIENT)
Dept: HEALTH INFORMATION MANAGEMENT | Facility: OTHER | Age: 73
End: 2023-04-14

## 2023-04-25 ENCOUNTER — OFFICE VISIT (OUTPATIENT)
Dept: MEDICAL GROUP | Facility: PHYSICIAN GROUP | Age: 73
End: 2023-04-25
Payer: MEDICARE

## 2023-04-25 VITALS
HEIGHT: 63 IN | TEMPERATURE: 98 F | RESPIRATION RATE: 16 BRPM | HEART RATE: 90 BPM | BODY MASS INDEX: 38.88 KG/M2 | DIASTOLIC BLOOD PRESSURE: 80 MMHG | SYSTOLIC BLOOD PRESSURE: 130 MMHG | WEIGHT: 219.4 LBS | OXYGEN SATURATION: 94 %

## 2023-04-25 DIAGNOSIS — I25.118 ATHSCL HEART DISEASE OF NATIVE COR ART W OTH ANG PCTRS (HCC): ICD-10-CM

## 2023-04-25 DIAGNOSIS — E78.2 MIXED HYPERLIPIDEMIA: ICD-10-CM

## 2023-04-25 DIAGNOSIS — E55.9 VITAMIN D DEFICIENCY: ICD-10-CM

## 2023-04-25 DIAGNOSIS — D68.2 FACTOR V DEFICIENCY (HCC): ICD-10-CM

## 2023-04-25 DIAGNOSIS — D68.69 HYPERCOAGULABLE STATE, SECONDARY (HCC): ICD-10-CM

## 2023-04-25 DIAGNOSIS — I10 ESSENTIAL HYPERTENSION: ICD-10-CM

## 2023-04-25 DIAGNOSIS — R73.01 IMPAIRED FASTING GLUCOSE: ICD-10-CM

## 2023-04-25 DIAGNOSIS — Z13.29 SCREENING FOR THYROID DISORDER: ICD-10-CM

## 2023-04-25 PROCEDURE — 99214 OFFICE O/P EST MOD 30 MIN: CPT | Performed by: NURSE PRACTITIONER

## 2023-04-25 RX ORDER — KETOCONAZOLE 20 MG/ML
5 SHAMPOO TOPICAL
Qty: 120 ML | Refills: 1 | Status: SHIPPED | OUTPATIENT
Start: 2023-04-25

## 2023-04-25 ASSESSMENT — PATIENT HEALTH QUESTIONNAIRE - PHQ9: CLINICAL INTERPRETATION OF PHQ2 SCORE: 0

## 2023-04-25 NOTE — ASSESSMENT & PLAN NOTE
Chronic stable condition.  Patient is stable on rosuvastatin 5 mg daily.    Patient to continue rosuvastatin 5 mg daily

## 2023-04-25 NOTE — ASSESSMENT & PLAN NOTE
This is a chronic stable condition.  Patient is stable on Xarelto 20 mg daily.  Patient is on anticoagulation due to factor V Leyden disorder as well as history of PE and DVT.    Patient to continue Xarelto 20 mg daily.

## 2023-04-25 NOTE — ASSESSMENT & PLAN NOTE
Chronic condition. Patient is stable on lisinopril 10 mg daily and metoprolol XL 25mg . She denies any chest pain, palpitations, or headaches.     Patient to continue lisinopril 10 mg and metoprolol XL 25mg daily.

## 2023-04-25 NOTE — ASSESSMENT & PLAN NOTE
This is a chronic stable condition. Had a cath in 2020. Patient is on rosuvastatin and anticoagulation.  Patient denies any chest pain and/or palpitations.     ASA is contraindicated as patient is on xarelto for DVT.     Patient to continue rosuvastatin 5mg daily. Patient to continue xarelto 20mg.

## 2023-04-25 NOTE — PROGRESS NOTES
"  Chief Complaint   Patient presents with    Other     Fallow up on factor V                                                                                                                                        HPI:   Trina presents today with the following.    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 120 mL 1    rivaroxaban (XARELTO) 20 MG Tab tablet Take 1 Tablet by mouth with dinner. 90 Tablet 3    rosuvastatin (CRESTOR) 5 MG Tab Take 1 Tablet by mouth every day. 90 Tablet 3    metoprolol SR (TOPROL XL) 25 MG TABLET SR 24 HR Take 1 Tablet by mouth every day. 90 Tablet 3    lisinopril (PRINIVIL) 10 MG Tab Take 1 Tablet by mouth every day. 90 Tablet 3    chlorthalidone (HYGROTON) 25 MG Tab Take 1 Tablet by mouth every day. 90 Tablet 3    gabapentin (NEURONTIN) 300 MG Cap Take 1-2 Capsules by mouth at bedtime as needed (restless leg). 120 Capsule 3    Calcium Carb-Cholecalciferol (CALCIUM 600+D) 600-800 MG-UNIT Tab Take  by mouth every day.      MULTIPLE VITAMIN PO Take  by mouth every day.       No current facility-administered medications for this visit.       Allergies as of 04/25/2023    (No Known Allergies)        ROS:  All systems negative expect as addressed in assessment and plan.     /80 (BP Location: Left arm, Patient Position: Sitting, BP Cuff Size: Adult)   Pulse 90   Temp 36.7 °C (98 °F) (Temporal)   Resp 16   Ht 1.6 m (5' 3\")   Wt 99.5 kg (219 lb 6.4 oz)   SpO2 94%   BMI 38.86 kg/m²       Physical Exam  Vitals reviewed.   Constitutional:       Appearance: Normal appearance.   HENT:      Head: Normocephalic and atraumatic.      Mouth/Throat:      Mouth: Mucous membranes are moist.   Eyes: "      Extraocular Movements: Extraocular movements intact.      Conjunctiva/sclera: Conjunctivae normal.   Pulmonary:      Effort: Pulmonary effort is normal.   Musculoskeletal:         General: Normal range of motion.      Cervical back: Normal range of motion.   Skin:     General: Skin is warm and dry.   Neurological:      General: No focal deficit present.      Mental Status: She is alert and oriented to person, place, and time.   Psychiatric:         Mood and Affect: Mood normal.         Behavior: Behavior normal.         Thought Content: Thought content normal.         Assessment and Plan:  73 y.o. female with the following issues.    1. Essential hypertension        2. [facility]        3. Factor V deficiency ([facility])        4. Mixed hyperlipidemia        5. Hypercoagulable state, secondary ([facility])             Essential hypertension  Chronic condition. Patient is stable on lisinopril 10 mg daily and metoprolol XL 25mg . She denies any chest pain, palpitations, or headaches.     Patient to continue lisinopril 10 mg and metoprolol XL 25mg daily.     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 Had a cath in 2020. Patient is on rosuvastatin and anticoagulation.  Patient denies any chest pain and/or palpitations.     ASA is contraindicated as patient is on xarelto for DVT.     Patient to continue rosuvastatin 5mg daily. Patient to continue xarelto 20mg.             Factor V deficiency (HCC)  Chronic stable condition. Patient is on xarelto 20mg daily. She denies any swelling, SOB, or pain in her extremities.     Patient to continue xarelto 20mg daily.     Hyperlipidemia  Chronic stable condition.  Patient is stable on rosuvastatin 5 mg daily.    Patient to continue rosuvastatin 5 mg daily    Hypercoagulable state, secondary (HCC)  This is a chronic stable condition.  Patient is stable on Xarelto 20 mg daily.  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    Patient to continue Xarelto 20 mg daily.      Return in about 6 months (around 10/25/2023) for follow up for HTN and anticoag.      I have placed the below orders and discussed them with an approved delegating provider.  The MA is performing the below orders under the direction of Dr. Morgan    Please note that this dictation was created using voice recognition software. I have worked with consultants from the vendor as well as technical experts from [email or web address] to optimize the interface. I have made every reasonable attempt to correct obvious errors, but I expect that there are errors of grammar and possibly content that I did not discover before finalizing the note.

## 2023-04-25 NOTE — ASSESSMENT & PLAN NOTE
Chronic stable condition. Patient is on xarelto 20mg daily. She denies any swelling, SOB, or pain in her extremities.     Patient to continue xarelto 20mg daily.

## 2023-06-21 ENCOUNTER — TELEPHONE (OUTPATIENT)
Dept: SLEEP MEDICINE | Facility: MEDICAL CENTER | Age: 73
End: 2023-06-21
Payer: MEDICARE

## 2023-06-21 NOTE — TELEPHONE ENCOUNTER
Chart reviewed as a part of the Lung Cancer Screening program audit.  Patient saw Dr. Phillips on 6/29/21 and lung cancer screening was discussed and CT was ordered, but no G code was placed that visit for SDM.  I recommend patient be seen by the Lung Cancer Screening program to ensure documentation and G code placement to help improve CT coverage for screening, if patient desires to pursue screening.  Msg sent to PCP to submit new referral to the [facility] if patient desires to start screening.   -Dr. Va Martinez (Lung Nodule Provider)

## 2023-09-21 DIAGNOSIS — M79.605 PAIN IN BOTH LOWER EXTREMITIES: ICD-10-CM

## 2023-09-21 DIAGNOSIS — M79.604 PAIN IN BOTH LOWER EXTREMITIES: ICD-10-CM

## 2023-09-22 RX ORDER — GABAPENTIN 300 MG/1
CAPSULE ORAL
Qty: 180 CAPSULE | Refills: 0 | Status: SHIPPED | OUTPATIENT
Start: 2023-09-22 | End: 2023-11-09 | Stop reason: SDUPTHER

## 2023-10-03 ENCOUNTER — APPOINTMENT (OUTPATIENT)
Dept: MEDICAL GROUP | Facility: PHYSICIAN GROUP | Age: 73
End: 2023-10-03
Payer: MEDICARE

## 2023-10-03 ENCOUNTER — HOSPITAL ENCOUNTER (OUTPATIENT)
Dept: LAB | Facility: MEDICAL CENTER | Age: 73
End: 2023-10-03
Attending: NURSE PRACTITIONER
Payer: MEDICARE

## 2023-10-03 DIAGNOSIS — D68.69 HYPERCOAGULABLE STATE, SECONDARY (HCC): ICD-10-CM

## 2023-10-03 DIAGNOSIS — Z13.29 SCREENING FOR THYROID DISORDER: ICD-10-CM

## 2023-10-03 DIAGNOSIS — R73.01 IMPAIRED FASTING GLUCOSE: ICD-10-CM

## 2023-10-03 DIAGNOSIS — D68.2 FACTOR V DEFICIENCY (HCC): ICD-10-CM

## 2023-10-03 DIAGNOSIS — I25.118 ATHSCL HEART DISEASE OF NATIVE COR ART W OTH ANG PCTRS (HCC): ICD-10-CM

## 2023-10-03 DIAGNOSIS — E55.9 VITAMIN D DEFICIENCY: ICD-10-CM

## 2023-10-03 DIAGNOSIS — I10 ESSENTIAL HYPERTENSION: ICD-10-CM

## 2023-10-03 DIAGNOSIS — E78.2 MIXED HYPERLIPIDEMIA: ICD-10-CM

## 2023-10-03 LAB
25(OH)D3 SERPL-MCNC: 29 NG/ML (ref 30–100)
ALBUMIN SERPL BCP-MCNC: 4.1 G/DL (ref 3.2–4.9)
ALBUMIN/GLOB SERPL: 1.5 G/DL
ALP SERPL-CCNC: 75 U/L (ref 30–99)
ALT SERPL-CCNC: 16 U/L (ref 2–50)
ANION GAP SERPL CALC-SCNC: 10 MMOL/L (ref 7–16)
AST SERPL-CCNC: 14 U/L (ref 12–45)
BILIRUB SERPL-MCNC: 0.3 MG/DL (ref 0.1–1.5)
BUN SERPL-MCNC: 18 MG/DL (ref 8–22)
CALCIUM ALBUM COR SERPL-MCNC: 10.8 MG/DL (ref 8.5–10.5)
CALCIUM SERPL-MCNC: 10.9 MG/DL (ref 8.5–10.5)
CHLORIDE SERPL-SCNC: 104 MMOL/L (ref 96–112)
CHOLEST SERPL-MCNC: 153 MG/DL (ref 100–199)
CO2 SERPL-SCNC: 26 MMOL/L (ref 20–33)
CREAT SERPL-MCNC: 0.77 MG/DL (ref 0.5–1.4)
ERYTHROCYTE [DISTWIDTH] IN BLOOD BY AUTOMATED COUNT: 48.3 FL (ref 35.9–50)
EST. AVERAGE GLUCOSE BLD GHB EST-MCNC: 146 MG/DL
FASTING STATUS PATIENT QL REPORTED: NORMAL
GFR SERPLBLD CREATININE-BSD FMLA CKD-EPI: 82 ML/MIN/1.73 M 2
GLOBULIN SER CALC-MCNC: 2.8 G/DL (ref 1.9–3.5)
GLUCOSE SERPL-MCNC: 121 MG/DL (ref 65–99)
HBA1C MFR BLD: 6.7 % (ref 4–5.6)
HCT VFR BLD AUTO: 49 % (ref 37–47)
HDLC SERPL-MCNC: 41 MG/DL
HGB BLD-MCNC: 16.5 G/DL (ref 12–16)
LDLC SERPL CALC-MCNC: 89 MG/DL
MCH RBC QN AUTO: 30.3 PG (ref 27–33)
MCHC RBC AUTO-ENTMCNC: 33.7 G/DL (ref 32.2–35.5)
MCV RBC AUTO: 90.1 FL (ref 81.4–97.8)
PLATELET # BLD AUTO: 190 K/UL (ref 164–446)
PMV BLD AUTO: 13.1 FL (ref 9–12.9)
POTASSIUM SERPL-SCNC: 4.3 MMOL/L (ref 3.6–5.5)
PROT SERPL-MCNC: 6.9 G/DL (ref 6–8.2)
RBC # BLD AUTO: 5.44 M/UL (ref 4.2–5.4)
SODIUM SERPL-SCNC: 140 MMOL/L (ref 135–145)
T4 FREE SERPL-MCNC: 1.24 NG/DL (ref 0.93–1.7)
TRIGL SERPL-MCNC: 116 MG/DL (ref 0–149)
TSH SERPL DL<=0.005 MIU/L-ACNC: 2.53 UIU/ML (ref 0.38–5.33)
WBC # BLD AUTO: 8.8 K/UL (ref 4.8–10.8)

## 2023-10-03 PROCEDURE — 84443 ASSAY THYROID STIM HORMONE: CPT

## 2023-10-03 PROCEDURE — 80053 COMPREHEN METABOLIC PANEL: CPT

## 2023-10-03 PROCEDURE — 82306 VITAMIN D 25 HYDROXY: CPT

## 2023-10-03 PROCEDURE — 84439 ASSAY OF FREE THYROXINE: CPT

## 2023-10-03 PROCEDURE — 36415 COLL VENOUS BLD VENIPUNCTURE: CPT

## 2023-10-03 PROCEDURE — 83036 HEMOGLOBIN GLYCOSYLATED A1C: CPT | Mod: GA

## 2023-10-03 PROCEDURE — 85027 COMPLETE CBC AUTOMATED: CPT

## 2023-10-03 PROCEDURE — 80061 LIPID PANEL: CPT

## 2023-11-09 ENCOUNTER — OFFICE VISIT (OUTPATIENT)
Dept: MEDICAL GROUP | Facility: PHYSICIAN GROUP | Age: 73
End: 2023-11-09
Payer: MEDICARE

## 2023-11-09 VITALS
RESPIRATION RATE: 16 BRPM | HEART RATE: 89 BPM | DIASTOLIC BLOOD PRESSURE: 70 MMHG | OXYGEN SATURATION: 95 % | WEIGHT: 223 LBS | TEMPERATURE: 96.5 F | HEIGHT: 63 IN | SYSTOLIC BLOOD PRESSURE: 114 MMHG | BODY MASS INDEX: 39.51 KG/M2

## 2023-11-09 DIAGNOSIS — I27.82 CHRONIC PULMONARY EMBOLISM, UNSPECIFIED PULMONARY EMBOLISM TYPE, UNSPECIFIED WHETHER ACUTE COR PULMONALE PRESENT (HCC): ICD-10-CM

## 2023-11-09 DIAGNOSIS — E11.69 DYSLIPIDEMIA DUE TO TYPE 2 DIABETES MELLITUS (HCC): ICD-10-CM

## 2023-11-09 DIAGNOSIS — E11.59 HYPERTENSION ASSOCIATED WITH TYPE 2 DIABETES MELLITUS (HCC): ICD-10-CM

## 2023-11-09 DIAGNOSIS — I15.2 HYPERTENSION ASSOCIATED WITH TYPE 2 DIABETES MELLITUS (HCC): ICD-10-CM

## 2023-11-09 DIAGNOSIS — M79.604 PAIN IN BOTH LOWER EXTREMITIES: ICD-10-CM

## 2023-11-09 DIAGNOSIS — D68.2 FACTOR V DEFICIENCY (HCC): ICD-10-CM

## 2023-11-09 DIAGNOSIS — E11.69 TYPE 2 DIABETES MELLITUS WITH OTHER SPECIFIED COMPLICATION, WITHOUT LONG-TERM CURRENT USE OF INSULIN (HCC): ICD-10-CM

## 2023-11-09 DIAGNOSIS — M79.605 PAIN IN BOTH LOWER EXTREMITIES: ICD-10-CM

## 2023-11-09 DIAGNOSIS — E78.5 DYSLIPIDEMIA DUE TO TYPE 2 DIABETES MELLITUS (HCC): ICD-10-CM

## 2023-11-09 DIAGNOSIS — D68.69 HYPERCOAGULABLE STATE, SECONDARY (HCC): ICD-10-CM

## 2023-11-09 DIAGNOSIS — H25.9 AGE-RELATED CATARACT OF BOTH EYES, UNSPECIFIED AGE-RELATED CATARACT TYPE: ICD-10-CM

## 2023-11-09 DIAGNOSIS — I10 ESSENTIAL HYPERTENSION: ICD-10-CM

## 2023-11-09 DIAGNOSIS — Z23 NEED FOR VACCINATION: ICD-10-CM

## 2023-11-09 DIAGNOSIS — E78.2 MIXED HYPERLIPIDEMIA: ICD-10-CM

## 2023-11-09 DIAGNOSIS — E66.01 CLASS 2 SEVERE OBESITY DUE TO EXCESS CALORIES WITH SERIOUS COMORBIDITY AND BODY MASS INDEX (BMI) OF 39.0 TO 39.9 IN ADULT (HCC): ICD-10-CM

## 2023-11-09 DIAGNOSIS — I82.501 CHRONIC DEEP VEIN THROMBOSIS (DVT) OF RIGHT LOWER EXTREMITY, UNSPECIFIED VEIN (HCC): ICD-10-CM

## 2023-11-09 PROBLEM — E11.9 TYPE 2 DIABETES MELLITUS, WITHOUT LONG-TERM CURRENT USE OF INSULIN (HCC): Status: ACTIVE | Noted: 2023-11-09

## 2023-11-09 PROCEDURE — G0008 ADMIN INFLUENZA VIRUS VAC: HCPCS | Performed by: NURSE PRACTITIONER

## 2023-11-09 PROCEDURE — 99214 OFFICE O/P EST MOD 30 MIN: CPT | Mod: 25 | Performed by: NURSE PRACTITIONER

## 2023-11-09 PROCEDURE — 3074F SYST BP LT 130 MM HG: CPT | Performed by: NURSE PRACTITIONER

## 2023-11-09 PROCEDURE — 90662 IIV NO PRSV INCREASED AG IM: CPT | Performed by: NURSE PRACTITIONER

## 2023-11-09 PROCEDURE — 3078F DIAST BP <80 MM HG: CPT | Performed by: NURSE PRACTITIONER

## 2023-11-09 RX ORDER — LISINOPRIL 10 MG/1
10 TABLET ORAL DAILY
Qty: 90 TABLET | Refills: 3 | Status: SHIPPED | OUTPATIENT
Start: 2023-11-09 | End: 2024-02-26 | Stop reason: SDUPTHER

## 2023-11-09 RX ORDER — CHLORTHALIDONE 25 MG/1
25 TABLET ORAL DAILY
Qty: 90 TABLET | Refills: 3 | Status: SHIPPED | OUTPATIENT
Start: 2023-11-09 | End: 2024-02-26 | Stop reason: SDUPTHER

## 2023-11-09 RX ORDER — METFORMIN HYDROCHLORIDE 500 MG/1
500 TABLET, EXTENDED RELEASE ORAL DAILY
Qty: 90 TABLET | Refills: 3 | Status: SHIPPED | OUTPATIENT
Start: 2023-11-09 | End: 2024-02-26 | Stop reason: SDUPTHER

## 2023-11-09 RX ORDER — GABAPENTIN 300 MG/1
600 CAPSULE ORAL NIGHTLY
Qty: 180 CAPSULE | Refills: 3 | Status: SHIPPED | OUTPATIENT
Start: 2023-11-09 | End: 2024-02-26 | Stop reason: SDUPTHER

## 2023-11-09 RX ORDER — METOPROLOL SUCCINATE 25 MG/1
25 TABLET, EXTENDED RELEASE ORAL DAILY
Qty: 90 TABLET | Refills: 3 | Status: SHIPPED | OUTPATIENT
Start: 2023-11-09 | End: 2024-02-26 | Stop reason: SDUPTHER

## 2023-11-09 RX ORDER — ROSUVASTATIN CALCIUM 5 MG/1
5 TABLET, COATED ORAL DAILY
Qty: 90 TABLET | Refills: 3 | Status: SHIPPED | OUTPATIENT
Start: 2023-11-09 | End: 2024-02-26 | Stop reason: SDUPTHER

## 2023-11-09 ASSESSMENT — FIBROSIS 4 INDEX: FIB4 SCORE: 1.31

## 2023-11-09 NOTE — PROGRESS NOTES
"  Chief Complaint   Patient presents with    Follow-Up                                                                                                                                       HPI:   Trina presents today with the following.    Problem   Class 2 Severe Obesity Due to Excess Calories With Serious Comorbidity and Body Mass Index (Bmi) of 39.0 to 39.9 in Adult (Hcc)   Type 2 Diabetes Mellitus, Without Long-Term Current Use of Insulin (Hcc)   Hypercoagulable State, Secondary (Hcc)   Hypertension Associated With Type 2 Diabetes Mellitus (Hcc)   Factor V Deficiency (Hcc)   Dyslipidemia Due to Type 2 Diabetes Mellitus (Hcc)       Current Outpatient Medications   Medication Sig Dispense Refill    metFORMIN ER (GLUCOPHAGE XR) 500 MG TABLET SR 24 HR Take 1 Tablet by mouth every day. 90 Tablet 3    chlorthalidone (HYGROTON) 25 MG Tab Take 1 Tablet by mouth every day. 90 Tablet 3    gabapentin (NEURONTIN) 300 MG Cap Take 2 Capsules by mouth every evening. 180 Capsule 3    lisinopril (PRINIVIL) 10 MG Tab Take 1 Tablet by mouth every day. 90 Tablet 3    metoprolol SR (TOPROL XL) 25 MG TABLET SR 24 HR Take 1 Tablet by mouth every day. 90 Tablet 3    rivaroxaban (XARELTO) 20 MG Tab tablet Take 1 Tablet by mouth with dinner. 90 Tablet 3    rosuvastatin (CRESTOR) 5 MG Tab Take 1 Tablet by mouth every day. 90 Tablet 3    ketoconazole (NIZORAL) 2 % shampoo Apply 5 mL topically 1 time a day as needed for Itching. 120 mL 1    Calcium Carb-Cholecalciferol (CALCIUM 600+D) 600-800 MG-UNIT Tab Take  by mouth every day.      MULTIPLE VITAMIN PO Take  by mouth every day.       No current facility-administered medications for this visit.       Allergies as of 11/09/2023    (No Known Allergies)        ROS:  All systems negative expect as addressed in assessment and plan.     /70 (BP Location: Right arm, Patient Position: Sitting)   Pulse 89   Temp 35.8 °C (96.5 °F) (Temporal)   Resp 16   Ht 1.6 m (5' 3\")   Wt 101 kg (223 " lb)   SpO2 95%   BMI 39.50 kg/m²       Physical Exam  Vitals reviewed.   Constitutional:       Appearance: Normal appearance.   HENT:      Head: Normocephalic and atraumatic.      Mouth/Throat:      Mouth: Mucous membranes are moist.   Eyes:      Extraocular Movements: Extraocular movements intact.      Conjunctiva/sclera: Conjunctivae normal.   Pulmonary:      Effort: Pulmonary effort is normal.   Musculoskeletal:         General: Normal range of motion.      Cervical back: Normal range of motion.   Skin:     General: Skin is warm and dry.   Neurological:      General: No focal deficit present.      Mental Status: She is alert and oriented to person, place, and time.   Psychiatric:         Mood and Affect: Mood normal.         Behavior: Behavior normal.         Thought Content: Thought content normal.           Assessment and Plan:  73 y.o. female with the following issues.    1. Need for vaccination  INFLUENZA VACCINE, HIGH DOSE (65+ ONLY)      2. Age-related cataract of both eyes, unspecified age-related cataract type  Referral to Ophthalmology      3. Essential hypertension  chlorthalidone (HYGROTON) 25 MG Tab    lisinopril (PRINIVIL) 10 MG Tab    metoprolol SR (TOPROL XL) 25 MG TABLET SR 24 HR      4. Pain in both lower extremities  gabapentin (NEURONTIN) 300 MG Cap      5. Factor V deficiency (HCC)  rivaroxaban (XARELTO) 20 MG Tab tablet      6. Chronic deep vein thrombosis (DVT) of right lower extremity, unspecified vein (HCC)  rivaroxaban (XARELTO) 20 MG Tab tablet      7. Chronic pulmonary embolism, unspecified pulmonary embolism type, unspecified whether acute cor pulmonale present (HCC)  rivaroxaban (XARELTO) 20 MG Tab tablet      8. Mixed hyperlipidemia  rosuvastatin (CRESTOR) 5 MG Tab      9. Type 2 diabetes mellitus with other specified complication, without long-term current use of insulin (HCC)        10. Hypercoagulable state, secondary (HCC)        11. Hypertension associated with type 2 diabetes  mellitus (HCC)        12. Dyslipidemia due to type 2 diabetes mellitus (HCC)        13. Class 2 severe obesity due to excess calories with serious comorbidity and body mass index (BMI) of 39.0 to 39.9 in adult (HCC)             Type 2 diabetes mellitus, without long-term current use of insulin (HCC)  Chronic stable.    Patient's most recent A1c is 6.7%.  Reviewed most recent labs with patient.    Continue metformin  mg daily.    Factor V deficiency (HCC)  Chronic stable condition.    Patient is on Xarelto 20 mg daily.  Continue anticoagulation.    Hypercoagulable state, secondary (HCC)  Chronic stable.     Patient has hypercoagulability due to factor V.  She does have a history of DVT as well as PE.    Patient remains on Xarelto 20 mg daily.  Continue anticoagulation long-term.    Hypertension associated with type 2 diabetes mellitus (HCC)  Chronic stable condition.      Continue metoprolol XL 25 mg daily, lisinopril 10 mg daily, and chlorthalidone 25 mg daily.    Dyslipidemia due to type 2 diabetes mellitus (HCC)  Chronic stable.    Reviewed most recent labs with patient.    Continue rosuvastatin 5 mg daily.    Class 2 severe obesity due to excess calories with serious comorbidity and body mass index (BMI) of 39.0 to 39.9 in adult (HCC)  Chronic stable.    Encouraged lifestyle changes and activity.    Patient to follow-up at least yearly.      Return in about 6 months (around 5/9/2024) for Chronic Health Conditions, Diabetes.      Please note that this dictation was created using voice recognition software. I have worked with consultants from the vendor as well as technical experts from [facility] [employer] to optimize the interface. I have made every reasonable attempt to correct obvious errors, but I expect that there are errors of grammar and possibly content that I did not discover before finalizing the note.

## 2023-11-10 PROBLEM — E11.69 DYSLIPIDEMIA DUE TO TYPE 2 DIABETES MELLITUS (HCC): Status: ACTIVE | Noted: 2020-06-12

## 2023-11-10 PROBLEM — E11.59 HYPERTENSION ASSOCIATED WITH TYPE 2 DIABETES MELLITUS (HCC): Status: ACTIVE | Noted: 2021-03-08

## 2023-11-10 PROBLEM — E66.812 CLASS 2 SEVERE OBESITY DUE TO EXCESS CALORIES WITH SERIOUS COMORBIDITY AND BODY MASS INDEX (BMI) OF 39.0 TO 39.9 IN ADULT (HCC): Status: ACTIVE | Noted: 2023-11-10

## 2023-11-10 PROBLEM — E66.01 CLASS 2 SEVERE OBESITY DUE TO EXCESS CALORIES WITH SERIOUS COMORBIDITY AND BODY MASS INDEX (BMI) OF 39.0 TO 39.9 IN ADULT (HCC): Status: ACTIVE | Noted: 2023-11-10

## 2023-11-10 PROBLEM — I15.2 HYPERTENSION ASSOCIATED WITH TYPE 2 DIABETES MELLITUS (HCC): Status: ACTIVE | Noted: 2021-03-08

## 2023-11-10 NOTE — ASSESSMENT & PLAN NOTE
Chronic stable condition.      Continue metoprolol XL 25 mg daily, lisinopril 10 mg daily, and chlorthalidone 25 mg daily.

## 2023-11-10 NOTE — ASSESSMENT & PLAN NOTE
Chronic stable.     Patient has hypercoagulability due to factor V.  She does have a history of DVT as well as PE.    Patient remains on Xarelto 20 mg daily.  Continue anticoagulation long-term.

## 2023-11-10 NOTE — ASSESSMENT & PLAN NOTE
Chronic stable.    Encouraged lifestyle changes and activity.    Patient to follow-up at least yearly.

## 2023-11-10 NOTE — ASSESSMENT & PLAN NOTE
Chronic stable.    Patient's most recent A1c is 6.7%.  Reviewed most recent labs with patient.    Continue metformin  mg daily.

## 2024-02-26 DIAGNOSIS — E78.2 MIXED HYPERLIPIDEMIA: ICD-10-CM

## 2024-02-26 DIAGNOSIS — M79.605 PAIN IN BOTH LOWER EXTREMITIES: ICD-10-CM

## 2024-02-26 DIAGNOSIS — I82.501 CHRONIC DEEP VEIN THROMBOSIS (DVT) OF RIGHT LOWER EXTREMITY, UNSPECIFIED VEIN (HCC): ICD-10-CM

## 2024-02-26 DIAGNOSIS — D68.2 FACTOR V DEFICIENCY (HCC): ICD-10-CM

## 2024-02-26 DIAGNOSIS — M79.604 PAIN IN BOTH LOWER EXTREMITIES: ICD-10-CM

## 2024-02-26 DIAGNOSIS — I27.82 CHRONIC PULMONARY EMBOLISM, UNSPECIFIED PULMONARY EMBOLISM TYPE, UNSPECIFIED WHETHER ACUTE COR PULMONALE PRESENT (HCC): ICD-10-CM

## 2024-02-26 DIAGNOSIS — I10 ESSENTIAL HYPERTENSION: ICD-10-CM

## 2024-02-26 NOTE — TELEPHONE ENCOUNTER
Received request via: Patient    Was the patient seen in the last year in this department? Yes    Does the patient have an active prescription (recently filled or refills available) for medication(s) requested? No    [facility]    Does the patient have longterm Plus and need 100 day supply (blood pressure, diabetes and cholesterol meds only)? Patient does not have SCP

## 2024-02-27 RX ORDER — CHLORTHALIDONE 25 MG/1
25 TABLET ORAL DAILY
Qty: 90 TABLET | Refills: 0 | Status: SHIPPED | OUTPATIENT
Start: 2024-02-27

## 2024-02-27 RX ORDER — ROSUVASTATIN CALCIUM 5 MG/1
5 TABLET, COATED ORAL DAILY
Qty: 90 TABLET | Refills: 0 | Status: SHIPPED | OUTPATIENT
Start: 2024-02-27

## 2024-02-27 RX ORDER — METOPROLOL SUCCINATE 25 MG/1
25 TABLET, EXTENDED RELEASE ORAL DAILY
Qty: 90 TABLET | Refills: 3 | Status: SHIPPED | OUTPATIENT
Start: 2024-02-27

## 2024-02-27 RX ORDER — GABAPENTIN 300 MG/1
600 CAPSULE ORAL NIGHTLY
Qty: 180 CAPSULE | Refills: 0 | Status: SHIPPED | OUTPATIENT
Start: 2024-02-27

## 2024-02-27 RX ORDER — METFORMIN HYDROCHLORIDE 500 MG/1
500 TABLET, EXTENDED RELEASE ORAL DAILY
Qty: 90 TABLET | Refills: 0 | Status: SHIPPED | OUTPATIENT
Start: 2024-02-27

## 2024-02-27 RX ORDER — LISINOPRIL 10 MG/1
10 TABLET ORAL DAILY
Qty: 90 TABLET | Refills: 0 | Status: SHIPPED | OUTPATIENT
Start: 2024-02-27

## 2024-06-14 ENCOUNTER — OFFICE VISIT (OUTPATIENT)
Dept: MEDICAL GROUP | Facility: PHYSICIAN GROUP | Age: 74
End: 2024-06-14
Payer: MEDICARE

## 2024-06-14 VITALS
BODY MASS INDEX: 39.51 KG/M2 | DIASTOLIC BLOOD PRESSURE: 82 MMHG | SYSTOLIC BLOOD PRESSURE: 130 MMHG | TEMPERATURE: 97.4 F | HEIGHT: 63 IN | WEIGHT: 223 LBS | HEART RATE: 90 BPM | OXYGEN SATURATION: 94 % | RESPIRATION RATE: 16 BRPM

## 2024-06-14 DIAGNOSIS — I15.2 HYPERTENSION ASSOCIATED WITH TYPE 2 DIABETES MELLITUS (HCC): ICD-10-CM

## 2024-06-14 DIAGNOSIS — M79.89 LEG SWELLING: ICD-10-CM

## 2024-06-14 DIAGNOSIS — E11.59 HYPERTENSION ASSOCIATED WITH TYPE 2 DIABETES MELLITUS (HCC): ICD-10-CM

## 2024-06-14 DIAGNOSIS — J30.2 SEASONAL ALLERGIC RHINITIS, UNSPECIFIED TRIGGER: ICD-10-CM

## 2024-06-14 DIAGNOSIS — E11.69 TYPE 2 DIABETES MELLITUS WITH OTHER SPECIFIED COMPLICATION, WITHOUT LONG-TERM CURRENT USE OF INSULIN (HCC): ICD-10-CM

## 2024-06-14 DIAGNOSIS — Z79.899 LONG TERM CURRENT USE OF DIURETIC: ICD-10-CM

## 2024-06-14 LAB
HBA1C MFR BLD: 6.6 % (ref ?–5.8)
POCT INT CON NEG: NEGATIVE
POCT INT CON POS: POSITIVE

## 2024-06-14 PROCEDURE — 3075F SYST BP GE 130 - 139MM HG: CPT | Performed by: NURSE PRACTITIONER

## 2024-06-14 PROCEDURE — 3079F DIAST BP 80-89 MM HG: CPT | Performed by: NURSE PRACTITIONER

## 2024-06-14 PROCEDURE — 83036 HEMOGLOBIN GLYCOSYLATED A1C: CPT | Performed by: NURSE PRACTITIONER

## 2024-06-14 PROCEDURE — 99214 OFFICE O/P EST MOD 30 MIN: CPT | Performed by: NURSE PRACTITIONER

## 2024-06-14 RX ORDER — AZELASTINE 1 MG/ML
1 SPRAY, METERED NASAL 2 TIMES DAILY
Qty: 30 ML | Refills: 5 | Status: SHIPPED | OUTPATIENT
Start: 2024-06-14

## 2024-06-14 RX ORDER — METFORMIN HYDROCHLORIDE 500 MG/1
500 TABLET, EXTENDED RELEASE ORAL 2 TIMES DAILY
Qty: 180 TABLET | Refills: 3 | Status: SHIPPED | OUTPATIENT
Start: 2024-06-14

## 2024-06-14 RX ORDER — TRIAMTERENE AND HYDROCHLOROTHIAZIDE 37.5; 25 MG/1; MG/1
1 TABLET ORAL DAILY
Qty: 90 TABLET | Refills: 3 | Status: SHIPPED | OUTPATIENT
Start: 2024-06-14

## 2024-06-14 ASSESSMENT — PATIENT HEALTH QUESTIONNAIRE - PHQ9: CLINICAL INTERPRETATION OF PHQ2 SCORE: 0

## 2024-06-14 ASSESSMENT — FIBROSIS 4 INDEX: FIB4 SCORE: 1.33

## 2024-06-14 NOTE — PROGRESS NOTES
"  Chief Complaint   Patient presents with    Follow-Up     Chronic Health Conditions, Diabetes                                                                                                                                       HPI:   Trina presents today with the following.    The patient attended the consultation today to discuss concerns regarding her current health status and medication management.    The patient reports that her legs have been swollen, and she is concerned about the effectiveness of her current medication, chlorthalidone. She has been taking it for a few years but feels that her age and weight gain may be contributing to its decreased efficacy. She also mentions occasional burning sensations on the bottom of her feet, which she attributes to her blood clot and swelling.      The patient has been consuming a 26-ounce bottle of Powerade daily, which contains sugar. She is aware of the potential impact on her blood sugar levels but continues to drink it for the electrolytes. Her most recent A1C was 6.6, indicating that her diabetes is not well-controlled.    She reports difficulty sleeping and is currently taking one gabapentin at night. Additionally, the patient experiences sinus and allergy symptoms and has tried Flonase and Nasacort in the past.    The patient's concerns regarding her swollen legs, medication effectiveness, sleep difficulties, and sinus/allergy symptoms have been discussed, along with her dietary habits and their potential impact on her diabetes management.    ROS:  All systems negative expect as addressed in assessment and plan.     /82 (BP Location: Left arm, Patient Position: Sitting)   Pulse 90   Temp 36.3 °C (97.4 °F) (Temporal)   Resp 16   Ht 1.6 m (5' 3\")   Wt 101 kg (223 lb)   SpO2 94%   BMI 39.50 kg/m²     Objective:    Physical Exam    Diagnostic Test Results and Labs:  - A1C (current visit): 6.6, indicating diabetes management within the diabetic " range.  - Future Testing: Orders placed for monitoring electrolytes and an additional A1C test to assess blood sugar control.    Clinical Observations:      - Physical Examination:    - Edema: Patient reports swollen legs.    - Dermatological: Presence of discoloration attributed to an old blood clot.    - Neurological: Patient experiences intermittent burning sensation at the bottom of the feet, possibly related to diabetic neuropathy and exacerbated by reduced circulation due to swelling.    Assessment and Plan:  74 y.o. female with the following issues.    1. Type 2 diabetes mellitus with other specified complication, without long-term current use of insulin (HCC)  - POCT Hemoglobin A1C  - metFORMIN ER (GLUCOPHAGE XR) 500 MG TABLET SR 24 HR; Take 1 Tablet by mouth 2 times a day.  Dispense: 180 Tablet; Refill: 3    2. Long term current use of diuretic  - MAGNESIUM; Future  - Comp Metabolic Panel; Future    3. Hypertension associated with type 2 diabetes mellitus (HCC)  - triamterene-hctz (MAXZIDE-25/DYAZIDE) 37.5-25 MG Tab; Take 1 Tablet by mouth every day.  Dispense: 90 Tablet; Refill: 3    4. Leg swelling  - triamterene-hctz (MAXZIDE-25/DYAZIDE) 37.5-25 MG Tab; Take 1 Tablet by mouth every day.  Dispense: 90 Tablet; Refill: 3    5. Seasonal allergic rhinitis, unspecified trigger  - azelastine (ASTELIN) 137 MCG/SPRAY nasal spray; Administer 1 Spray into affected nostril(S) 2 times a day.  Dispense: 30 mL; Refill: 5    1. Lower Extremity Edema:     - Assessment: Likely secondary to venous insufficiency from previous blood clot.     - Plan:          a. Discontinue chlorthalidone and start Maxzide (combination diuretic) to better manage edema and blood pressure without significantly affecting electrolytes.          b. Instruct patient to double up on chlorthalidone until the new prescription arrives.          c. Monitor electrolytes with lab work.    2. Hypertension:     - Assessment: Blood pressure slightly  elevated, possibly due to fluid retention.     - Plan:          a. Start Maxzide to manage both edema and blood pressure.          b. Monitor blood pressure and adjust medication as needed.    3. Type 2 Diabetes Mellitus:     - Assessment: A1C at 6.6, indicating suboptimal glycemic control.     - Plan:          a. Increase Metformin dosage by one tablet (either two tablets at once or one tablet twice a day).          b. Recheck A1C in four months.    4. Diabetic Peripheral Neuropathy:     - Assessment: Patient reports occasional burning sensation in feet.     - Plan:          a. Continue gabapentin as needed for neuropathic pain.          b. Encourage patient to monitor and report any worsening of symptoms.    5. Allergic Rhinitis:     - Plan:          a. Discontinue Flonase and Nasacort.          b. Prescribe antihistamine nasal spray (e.g., Azelastine) to help with sinus and allergy symptoms.          c. Send prescription to [facility].    6. Routine Lab Work:     - Plan:          a. Order labs to monitor electrolytes, sodium, potassium, and A1C.          b. Instruct patient that labs can be done at any time and do not require fasting.    7. Follow-up:     - Plan: Schedule a follow-up appointment in four months to reassess Metformin and diuretic management, as well as A1C levels.    Return in about 4 months (around 10/14/2024) for Diabetes, HTN.      Please note that this dictation was created using voice recognition software. I have worked with consultants from the vendor as well as technical experts from [facility] to optimize the interface. I have made every reasonable attempt to correct obvious errors, but I expect that there are errors of grammar and possibly content that I did not discover before finalizing the note.

## 2024-10-14 ENCOUNTER — OFFICE VISIT (OUTPATIENT)
Dept: MEDICAL GROUP | Facility: PHYSICIAN GROUP | Age: 74
End: 2024-10-14
Payer: MEDICARE

## 2024-10-14 ENCOUNTER — HOSPITAL ENCOUNTER (OUTPATIENT)
Dept: LAB | Facility: MEDICAL CENTER | Age: 74
End: 2024-10-14
Attending: FAMILY MEDICINE
Payer: MEDICARE

## 2024-10-14 VITALS
TEMPERATURE: 97 F | DIASTOLIC BLOOD PRESSURE: 72 MMHG | BODY MASS INDEX: 39.87 KG/M2 | OXYGEN SATURATION: 91 % | HEART RATE: 97 BPM | RESPIRATION RATE: 16 BRPM | WEIGHT: 225 LBS | SYSTOLIC BLOOD PRESSURE: 130 MMHG | HEIGHT: 63 IN

## 2024-10-14 DIAGNOSIS — E55.9 VITAMIN D DEFICIENCY: ICD-10-CM

## 2024-10-14 DIAGNOSIS — D68.69 HYPERCOAGULABLE STATE, SECONDARY (HCC): ICD-10-CM

## 2024-10-14 DIAGNOSIS — I25.118 ATHSCL HEART DISEASE OF NATIVE COR ART W OTH ANG PCTRS (HCC): ICD-10-CM

## 2024-10-14 DIAGNOSIS — Z13.29 SCREENING FOR THYROID DISORDER: ICD-10-CM

## 2024-10-14 DIAGNOSIS — E78.5 DYSLIPIDEMIA DUE TO TYPE 2 DIABETES MELLITUS (HCC): ICD-10-CM

## 2024-10-14 DIAGNOSIS — E08.42 DIABETES MELLITUS DUE TO UNDERLYING CONDITION WITH DIABETIC POLYNEUROPATHY, WITHOUT LONG-TERM CURRENT USE OF INSULIN (HCC): ICD-10-CM

## 2024-10-14 DIAGNOSIS — Z79.899 LONG TERM CURRENT USE OF DIURETIC: ICD-10-CM

## 2024-10-14 DIAGNOSIS — E11.69 DYSLIPIDEMIA DUE TO TYPE 2 DIABETES MELLITUS (HCC): ICD-10-CM

## 2024-10-14 DIAGNOSIS — R25.2 BILATERAL LEG CRAMPS: ICD-10-CM

## 2024-10-14 DIAGNOSIS — E11.69 TYPE 2 DIABETES MELLITUS WITH OTHER SPECIFIED COMPLICATION, WITHOUT LONG-TERM CURRENT USE OF INSULIN (HCC): ICD-10-CM

## 2024-10-14 DIAGNOSIS — Z23 NEED FOR VACCINATION: ICD-10-CM

## 2024-10-14 LAB
25(OH)D3 SERPL-MCNC: 26 NG/ML (ref 30–100)
ALBUMIN SERPL BCP-MCNC: 3.9 G/DL (ref 3.2–4.9)
ALBUMIN/GLOB SERPL: 1.3 G/DL
ALP SERPL-CCNC: 97 U/L (ref 30–99)
ALT SERPL-CCNC: 17 U/L (ref 2–50)
ANION GAP SERPL CALC-SCNC: 14 MMOL/L (ref 7–16)
AST SERPL-CCNC: 17 U/L (ref 12–45)
BILIRUB SERPL-MCNC: 0.2 MG/DL (ref 0.1–1.5)
BUN SERPL-MCNC: 18 MG/DL (ref 8–22)
CALCIUM ALBUM COR SERPL-MCNC: 11.1 MG/DL (ref 8.5–10.5)
CALCIUM SERPL-MCNC: 11 MG/DL (ref 8.5–10.5)
CHLORIDE SERPL-SCNC: 100 MMOL/L (ref 96–112)
CHOLEST SERPL-MCNC: 161 MG/DL (ref 100–199)
CO2 SERPL-SCNC: 23 MMOL/L (ref 20–33)
CREAT SERPL-MCNC: 0.7 MG/DL (ref 0.5–1.4)
CRP SERPL HS-MCNC: 0.71 MG/DL (ref 0–0.75)
ERYTHROCYTE [DISTWIDTH] IN BLOOD BY AUTOMATED COUNT: 46.5 FL (ref 35.9–50)
FASTING STATUS PATIENT QL REPORTED: NORMAL
FERRITIN SERPL-MCNC: 7.9 NG/ML (ref 10–291)
GFR SERPLBLD CREATININE-BSD FMLA CKD-EPI: 91 ML/MIN/1.73 M 2
GLOBULIN SER CALC-MCNC: 3.1 G/DL (ref 1.9–3.5)
GLUCOSE SERPL-MCNC: 140 MG/DL (ref 65–99)
HBA1C MFR BLD: 6.5 % (ref ?–5.8)
HCT VFR BLD AUTO: 44.7 % (ref 37–47)
HDLC SERPL-MCNC: 41 MG/DL
HGB BLD-MCNC: 14 G/DL (ref 12–16)
IRON SATN MFR SERPL: 5 % (ref 15–55)
IRON SERPL-MCNC: 22 UG/DL (ref 40–170)
LDLC SERPL CALC-MCNC: 88 MG/DL
MAGNESIUM SERPL-MCNC: 1.8 MG/DL (ref 1.5–2.5)
MCH RBC QN AUTO: 27.4 PG (ref 27–33)
MCHC RBC AUTO-ENTMCNC: 31.3 G/DL (ref 32.2–35.5)
MCV RBC AUTO: 87.5 FL (ref 81.4–97.8)
PLATELET # BLD AUTO: 275 K/UL (ref 164–446)
PMV BLD AUTO: 12.6 FL (ref 9–12.9)
POCT INT CON NEG: NEGATIVE
POCT INT CON POS: POSITIVE
POTASSIUM SERPL-SCNC: 4.8 MMOL/L (ref 3.6–5.5)
PROT SERPL-MCNC: 7 G/DL (ref 6–8.2)
RBC # BLD AUTO: 5.11 M/UL (ref 4.2–5.4)
SODIUM SERPL-SCNC: 137 MMOL/L (ref 135–145)
T4 FREE SERPL-MCNC: 1.25 NG/DL (ref 0.93–1.7)
TIBC SERPL-MCNC: 430 UG/DL (ref 250–450)
TRIGL SERPL-MCNC: 159 MG/DL (ref 0–149)
TSH SERPL-ACNC: 3.23 UIU/ML (ref 0.35–5.5)
UIBC SERPL-MCNC: 408 UG/DL (ref 110–370)
VIT B12 SERPL-MCNC: 457 PG/ML (ref 211–911)
WBC # BLD AUTO: 11.9 K/UL (ref 4.8–10.8)

## 2024-10-14 PROCEDURE — 84443 ASSAY THYROID STIM HORMONE: CPT

## 2024-10-14 PROCEDURE — 84439 ASSAY OF FREE THYROXINE: CPT

## 2024-10-14 PROCEDURE — 36415 COLL VENOUS BLD VENIPUNCTURE: CPT

## 2024-10-14 PROCEDURE — G0008 ADMIN INFLUENZA VIRUS VAC: HCPCS

## 2024-10-14 PROCEDURE — 82728 ASSAY OF FERRITIN: CPT

## 2024-10-14 PROCEDURE — 82306 VITAMIN D 25 HYDROXY: CPT

## 2024-10-14 PROCEDURE — 90662 IIV NO PRSV INCREASED AG IM: CPT

## 2024-10-14 PROCEDURE — 83550 IRON BINDING TEST: CPT

## 2024-10-14 PROCEDURE — 86140 C-REACTIVE PROTEIN: CPT

## 2024-10-14 PROCEDURE — 3078F DIAST BP <80 MM HG: CPT

## 2024-10-14 PROCEDURE — 82607 VITAMIN B-12: CPT

## 2024-10-14 PROCEDURE — 99215 OFFICE O/P EST HI 40 MIN: CPT | Mod: 25

## 2024-10-14 PROCEDURE — 83540 ASSAY OF IRON: CPT

## 2024-10-14 PROCEDURE — 83735 ASSAY OF MAGNESIUM: CPT

## 2024-10-14 PROCEDURE — 85027 COMPLETE CBC AUTOMATED: CPT

## 2024-10-14 PROCEDURE — 80053 COMPREHEN METABOLIC PANEL: CPT

## 2024-10-14 PROCEDURE — 80061 LIPID PANEL: CPT

## 2024-10-14 PROCEDURE — 83036 HEMOGLOBIN GLYCOSYLATED A1C: CPT

## 2024-10-14 PROCEDURE — 3075F SYST BP GE 130 - 139MM HG: CPT

## 2024-10-14 ASSESSMENT — FIBROSIS 4 INDEX: FIB4 SCORE: 1.33

## 2024-10-29 ENCOUNTER — TELEPHONE (OUTPATIENT)
Dept: HEALTH INFORMATION MANAGEMENT | Facility: OTHER | Age: 74
End: 2024-10-29
Payer: MEDICARE

## 2024-10-31 ENCOUNTER — OFFICE VISIT (OUTPATIENT)
Dept: MEDICAL GROUP | Facility: PHYSICIAN GROUP | Age: 74
End: 2024-10-31
Payer: MEDICARE

## 2024-10-31 ENCOUNTER — APPOINTMENT (OUTPATIENT)
Dept: RADIOLOGY | Facility: IMAGING CENTER | Age: 74
End: 2024-10-31
Attending: NURSE PRACTITIONER
Payer: MEDICARE

## 2024-10-31 VITALS
TEMPERATURE: 97.5 F | RESPIRATION RATE: 16 BRPM | WEIGHT: 228 LBS | BODY MASS INDEX: 40.4 KG/M2 | DIASTOLIC BLOOD PRESSURE: 78 MMHG | OXYGEN SATURATION: 91 % | HEIGHT: 63 IN | SYSTOLIC BLOOD PRESSURE: 108 MMHG | HEART RATE: 99 BPM

## 2024-10-31 DIAGNOSIS — R06.02 SHORT OF BREATH ON EXERTION: ICD-10-CM

## 2024-10-31 DIAGNOSIS — E61.1 IRON DEFICIENCY: ICD-10-CM

## 2024-10-31 DIAGNOSIS — Z72.0 TOBACCO ABUSE: ICD-10-CM

## 2024-10-31 PROCEDURE — 71046 X-RAY EXAM CHEST 2 VIEWS: CPT | Mod: TC | Performed by: NURSE PRACTITIONER

## 2024-10-31 RX ORDER — DIPHENHYDRAMINE HYDROCHLORIDE 50 MG/ML
50 INJECTION INTRAMUSCULAR; INTRAVENOUS PRN
OUTPATIENT
Start: 2024-10-31

## 2024-10-31 RX ORDER — EPINEPHRINE 1 MG/ML(1)
0.5 AMPUL (ML) INJECTION PRN
OUTPATIENT
Start: 2024-10-31

## 2024-10-31 RX ORDER — SODIUM CHLORIDE 9 MG/ML
INJECTION, SOLUTION INTRAVENOUS CONTINUOUS
OUTPATIENT
Start: 2024-10-31

## 2024-10-31 RX ORDER — METHYLPREDNISOLONE SODIUM SUCCINATE 125 MG/2ML
125 INJECTION, POWDER, LYOPHILIZED, FOR SOLUTION INTRAMUSCULAR; INTRAVENOUS PRN
OUTPATIENT
Start: 2024-10-31

## 2024-10-31 ASSESSMENT — FIBROSIS 4 INDEX: FIB4 SCORE: 1.08

## 2024-11-02 ENCOUNTER — OUTPATIENT INFUSION SERVICES (OUTPATIENT)
Dept: ONCOLOGY | Facility: MEDICAL CENTER | Age: 74
End: 2024-11-02
Attending: NURSE PRACTITIONER
Payer: MEDICARE

## 2024-11-02 VITALS
HEART RATE: 117 BPM | HEIGHT: 63 IN | OXYGEN SATURATION: 91 % | BODY MASS INDEX: 40.04 KG/M2 | TEMPERATURE: 96.8 F | SYSTOLIC BLOOD PRESSURE: 135 MMHG | RESPIRATION RATE: 19 BRPM | DIASTOLIC BLOOD PRESSURE: 74 MMHG | WEIGHT: 225.97 LBS

## 2024-11-02 DIAGNOSIS — D68.69 HYPERCOAGULABLE STATE, SECONDARY (HCC): ICD-10-CM

## 2024-11-02 DIAGNOSIS — E61.1 IRON DEFICIENCY: ICD-10-CM

## 2024-11-02 PROCEDURE — 700105 HCHG RX REV CODE 258: Performed by: NURSE PRACTITIONER

## 2024-11-02 PROCEDURE — 96365 THER/PROPH/DIAG IV INF INIT: CPT

## 2024-11-02 PROCEDURE — 700111 HCHG RX REV CODE 636 W/ 250 OVERRIDE (IP): Mod: JZ,JG | Performed by: NURSE PRACTITIONER

## 2024-11-02 RX ORDER — DIPHENHYDRAMINE HYDROCHLORIDE 50 MG/ML
50 INJECTION INTRAMUSCULAR; INTRAVENOUS PRN
OUTPATIENT
Start: 2024-11-02

## 2024-11-02 RX ORDER — METHYLPREDNISOLONE SODIUM SUCCINATE 125 MG/2ML
125 INJECTION, POWDER, LYOPHILIZED, FOR SOLUTION INTRAMUSCULAR; INTRAVENOUS PRN
OUTPATIENT
Start: 2024-11-02

## 2024-11-02 RX ORDER — SODIUM CHLORIDE 9 MG/ML
INJECTION, SOLUTION INTRAVENOUS CONTINUOUS
OUTPATIENT
Start: 2024-11-02

## 2024-11-02 RX ORDER — EPINEPHRINE 1 MG/ML(1)
0.5 AMPUL (ML) INJECTION PRN
OUTPATIENT
Start: 2024-11-02

## 2024-11-02 RX ADMIN — SODIUM CHLORIDE 1000 MG: 9 INJECTION, SOLUTION INTRAVENOUS at 15:24

## 2024-11-02 ASSESSMENT — FIBROSIS 4 INDEX: FIB4 SCORE: 1.08

## 2024-11-02 NOTE — PROGRESS NOTES
Trina came into infusion services today for iron dextran infusion. No complaints. First dose education provided for pt. Answered any question pt had. Pt agrees with POC. PIV started in the left AC, +blood return, flushed well. Labs drawn prior to appointment and within parameters for treatment. Iron dextran given as prescribed, tolerated well. PIV removed, covered with gauze and coban. Pt has follow up with provider. Discharged to self care.

## 2024-11-06 DIAGNOSIS — I10 ESSENTIAL HYPERTENSION: ICD-10-CM

## 2024-11-06 DIAGNOSIS — I27.82 CHRONIC PULMONARY EMBOLISM, UNSPECIFIED PULMONARY EMBOLISM TYPE, UNSPECIFIED WHETHER ACUTE COR PULMONALE PRESENT (HCC): ICD-10-CM

## 2024-11-06 DIAGNOSIS — I82.501 CHRONIC DEEP VEIN THROMBOSIS (DVT) OF RIGHT LOWER EXTREMITY, UNSPECIFIED VEIN (HCC): ICD-10-CM

## 2024-11-06 DIAGNOSIS — D68.2 FACTOR V DEFICIENCY (HCC): ICD-10-CM

## 2024-11-06 NOTE — TELEPHONE ENCOUNTER
Received request via: Patient    Was the patient seen in the last year in this department? Yes    Does the patient have an active prescription (recently filled or refills available) for medication(s) requested? No    [facility]    Does the patient have residential Plus and need 100-day supply? (This applies to ALL medications) Patient does not have SCP

## 2024-11-07 RX ORDER — LISINOPRIL 10 MG/1
10 TABLET ORAL DAILY
Qty: 90 TABLET | Refills: 3 | Status: SHIPPED | OUTPATIENT
Start: 2024-11-07

## 2024-11-07 RX ORDER — METOPROLOL SUCCINATE 25 MG/1
25 TABLET, EXTENDED RELEASE ORAL DAILY
Qty: 90 TABLET | Refills: 3 | Status: SHIPPED | OUTPATIENT
Start: 2024-11-07

## 2025-01-10 ENCOUNTER — APPOINTMENT (OUTPATIENT)
Dept: MEDICAL GROUP | Facility: PHYSICIAN GROUP | Age: 75
End: 2025-01-10
Payer: MEDICARE

## 2025-03-23 DIAGNOSIS — M79.89 LEG SWELLING: ICD-10-CM

## 2025-03-23 DIAGNOSIS — I10 ESSENTIAL HYPERTENSION: ICD-10-CM

## 2025-03-23 DIAGNOSIS — E11.69 TYPE 2 DIABETES MELLITUS WITH OTHER SPECIFIED COMPLICATION, WITHOUT LONG-TERM CURRENT USE OF INSULIN (HCC): ICD-10-CM

## 2025-03-23 DIAGNOSIS — E11.59 HYPERTENSION ASSOCIATED WITH TYPE 2 DIABETES MELLITUS (HCC): ICD-10-CM

## 2025-03-23 DIAGNOSIS — I15.2 HYPERTENSION ASSOCIATED WITH TYPE 2 DIABETES MELLITUS (HCC): ICD-10-CM

## 2025-03-24 RX ORDER — TRIAMTERENE AND HYDROCHLOROTHIAZIDE 37.5; 25 MG/1; MG/1
1 TABLET ORAL DAILY
Qty: 90 TABLET | Refills: 1 | Status: SHIPPED | OUTPATIENT
Start: 2025-03-24

## 2025-03-24 RX ORDER — METFORMIN HYDROCHLORIDE 500 MG/1
500 TABLET, EXTENDED RELEASE ORAL 2 TIMES DAILY
Qty: 180 TABLET | Refills: 1 | Status: SHIPPED | OUTPATIENT
Start: 2025-03-24

## 2025-03-24 RX ORDER — METOPROLOL SUCCINATE 25 MG/1
25 TABLET, EXTENDED RELEASE ORAL DAILY
Qty: 90 TABLET | Refills: 1 | Status: SHIPPED | OUTPATIENT
Start: 2025-03-24

## 2025-03-24 NOTE — TELEPHONE ENCOUNTER
Received request via: Patient    Was the patient seen in the last year in this department? Yes    Does the patient have an active prescription (recently filled or refills available) for medication(s) requested? No    [facility]    Does the patient have jail Plus and need 100-day supply? (This applies to ALL medications) Patient does not have SCP

## 2025-05-09 ENCOUNTER — APPOINTMENT (OUTPATIENT)
Dept: MEDICAL GROUP | Facility: PHYSICIAN GROUP | Age: 75
End: 2025-05-09
Payer: MEDICARE

## 2025-06-11 ENCOUNTER — HOSPITAL ENCOUNTER (OUTPATIENT)
Facility: MEDICAL CENTER | Age: 75
End: 2025-06-11
Attending: FAMILY MEDICINE
Payer: MEDICARE

## 2025-06-11 ENCOUNTER — OFFICE VISIT (OUTPATIENT)
Dept: MEDICAL GROUP | Facility: PHYSICIAN GROUP | Age: 75
End: 2025-06-11
Payer: MEDICARE

## 2025-06-11 VITALS
HEART RATE: 104 BPM | BODY MASS INDEX: 39.87 KG/M2 | HEIGHT: 63 IN | DIASTOLIC BLOOD PRESSURE: 76 MMHG | OXYGEN SATURATION: 95 % | WEIGHT: 225 LBS | TEMPERATURE: 97.3 F | RESPIRATION RATE: 16 BRPM | SYSTOLIC BLOOD PRESSURE: 116 MMHG

## 2025-06-11 DIAGNOSIS — E11.69 TYPE 2 DIABETES MELLITUS WITH OTHER SPECIFIED COMPLICATION, WITHOUT LONG-TERM CURRENT USE OF INSULIN (HCC): ICD-10-CM

## 2025-06-11 DIAGNOSIS — E11.59 HYPERTENSION ASSOCIATED WITH TYPE 2 DIABETES MELLITUS (HCC): ICD-10-CM

## 2025-06-11 DIAGNOSIS — Z72.0 TOBACCO ABUSE: ICD-10-CM

## 2025-06-11 DIAGNOSIS — J32.9 CHRONIC RHINOSINUSITIS: ICD-10-CM

## 2025-06-11 DIAGNOSIS — E11.69 DYSLIPIDEMIA DUE TO TYPE 2 DIABETES MELLITUS (HCC): ICD-10-CM

## 2025-06-11 DIAGNOSIS — D68.2 FACTOR V DEFICIENCY (HCC): ICD-10-CM

## 2025-06-11 DIAGNOSIS — E11.69 TYPE 2 DIABETES MELLITUS WITH OTHER SPECIFIED COMPLICATION, WITHOUT LONG-TERM CURRENT USE OF INSULIN (HCC): Primary | ICD-10-CM

## 2025-06-11 DIAGNOSIS — Z86.39 HISTORY OF IRON DEFICIENCY: ICD-10-CM

## 2025-06-11 DIAGNOSIS — I15.2 HYPERTENSION ASSOCIATED WITH TYPE 2 DIABETES MELLITUS (HCC): ICD-10-CM

## 2025-06-11 DIAGNOSIS — D68.69 HYPERCOAGULABLE STATE, SECONDARY (HCC): ICD-10-CM

## 2025-06-11 DIAGNOSIS — Z86.718 HISTORY OF DVT (DEEP VEIN THROMBOSIS): ICD-10-CM

## 2025-06-11 DIAGNOSIS — M79.89 RIGHT LEG SWELLING: ICD-10-CM

## 2025-06-11 DIAGNOSIS — E78.5 DYSLIPIDEMIA DUE TO TYPE 2 DIABETES MELLITUS (HCC): ICD-10-CM

## 2025-06-11 LAB
HBA1C MFR BLD: 7 % (ref ?–5.8)
POCT INT CON NEG: NEGATIVE
POCT INT CON POS: POSITIVE

## 2025-06-11 PROCEDURE — 2023F DILAT RTA XM W/O RTNOPTHY: CPT | Performed by: FAMILY MEDICINE

## 2025-06-11 PROCEDURE — 92228 IMG RTA DETC/MNTR DS PHY/QHP: CPT | Performed by: FAMILY MEDICINE

## 2025-06-11 PROCEDURE — 82570 ASSAY OF URINE CREATININE: CPT

## 2025-06-11 PROCEDURE — 99215 OFFICE O/P EST HI 40 MIN: CPT | Mod: 25 | Performed by: FAMILY MEDICINE

## 2025-06-11 PROCEDURE — 3078F DIAST BP <80 MM HG: CPT | Performed by: FAMILY MEDICINE

## 2025-06-11 PROCEDURE — 3074F SYST BP LT 130 MM HG: CPT | Performed by: FAMILY MEDICINE

## 2025-06-11 PROCEDURE — 83036 HEMOGLOBIN GLYCOSYLATED A1C: CPT | Performed by: FAMILY MEDICINE

## 2025-06-11 PROCEDURE — 82043 UR ALBUMIN QUANTITATIVE: CPT

## 2025-06-11 RX ORDER — FLUTICASONE PROPIONATE 50 MCG
1 SPRAY, SUSPENSION (ML) NASAL DAILY
Qty: 16 G | Refills: 2 | Status: SHIPPED | OUTPATIENT
Start: 2025-06-11

## 2025-06-11 RX ORDER — ROSUVASTATIN CALCIUM 10 MG/1
10 TABLET, COATED ORAL EVERY EVENING
Qty: 100 TABLET | Refills: 3 | Status: SHIPPED | OUTPATIENT
Start: 2025-06-11 | End: 2026-07-16

## 2025-06-11 RX ORDER — THIAMINE HCL 50 MG
100 TABLET ORAL DAILY
COMMUNITY

## 2025-06-11 RX ORDER — ROSUVASTATIN CALCIUM 10 MG/1
10 TABLET, COATED ORAL EVERY EVENING
Qty: 100 TABLET | Refills: 3 | Status: SHIPPED | OUTPATIENT
Start: 2025-06-11 | End: 2025-06-11

## 2025-06-11 ASSESSMENT — FIBROSIS 4 INDEX: FIB4 SCORE: 1.09

## 2025-06-11 ASSESSMENT — PATIENT HEALTH QUESTIONNAIRE - PHQ9: CLINICAL INTERPRETATION OF PHQ2 SCORE: 0

## 2025-06-11 NOTE — PROGRESS NOTES
Verbal consent was acquired by the patient to use [employer] ambient listening note generation during this visit YES    Subjective:     HPI:   History of Present Illness  This is a 75-year-old female patient, new to me, here to establish care.  Chart reviewed, including last primary care notes and labs.  Patient is due for diabetic visit including labs.     The patient presents for evaluation of right lower extremity edema, sinus congestion, diabetes mellitus, and hyperlipidemia management.    Right Lower Extremity Edema  - Persistent for the past 6 months  - wonders if it was related to her iron infusions?   - History of deep vein thrombosis (DVT) from leg to kidney, diagnosed 8 years ago  - Currently on Xarelto (rivaroxaban)  - Edema persists despite leg elevation  - Self-diagnosed recurrent DVTs managed with compression stockings?     Sinus Congestion  - Contributing to eye pressure  - Eye examination 1.5 years ago revealed a small cataract  - Experiences sinus and cervical pressure, excessive mucus production  - Utilizes saline nasal spray and over-the-counter antihistamines for symptom relief  - Previous nasal spray use resulted in gastrointestinal upset  - Pataday (olopatadine) eye drops were ineffective  - No history of recurrent sinus infections   - Experiences mild wheezing during allergy flare-ups but does not use inhalers  - Employs nocturnal oxygen therapy  - 40-year history of smoking  - Tests for chronic obstructive pulmonary disease (COPD) and sleep apnea conducted 10 years ago were negative  - Recently underwent a chest x-ray and declines lung cancer screening with chest CT    Decreased Physical Activity  - Secondary to iron deficiency anemia  - Resulting in excessive somnolence  - No significant improvement in symptoms despite iron infusion 6 months prior  - Inquires about potential use of vitamin B1 for enhancing hepatic iron retention    Diabetes  - has not been as active due to fatigue from  "anemia  - HbA1c 7 today    SOCIAL HISTORY  - Does not drink alcohol regularly  - 40-year smoking history        The 10-year ASCVD risk score (Sean ROGERS, et al., 2019) is: 36.3%      Objective:     Exam:  /76 (BP Location: Right arm, Patient Position: Sitting, BP Cuff Size: Adult)   Pulse (!) 104   Temp 36.3 °C (97.3 °F) (Temporal)   Resp 16   Ht 1.61 m (5' 3.39\")   Wt 102 kg (225 lb)   SpO2 95%   BMI 39.37 kg/m²  Body mass index is 39.37 kg/m².    Physical Exam  Vitals and nursing note reviewed.   Constitutional:       Appearance: Normal appearance.   HENT:      Head: Normocephalic and atraumatic.      Nose: Congestion and rhinorrhea present. Rhinorrhea is clear.      Right Sinus: Maxillary sinus tenderness present.      Left Sinus: Maxillary sinus tenderness present.   Cardiovascular:      Rate and Rhythm: Normal rate and regular rhythm.   Pulmonary:      Effort: Pulmonary effort is normal.      Breath sounds: Wheezing (few scattered wheezes throughout) present.   Musculoskeletal:      Right lower leg: Edema (non-pitting edema to just above knee level on right. no TTP. No erythema or warmth) present.      Left lower leg: Edema (non-pitting pedal and pretibial edema) present.      Comments: Diabetic Foot Exam  Monofilament testing with a 10 gram force: sensation intact: intact bilaterally  Visual Inspection: Feet without maceration, ulcers, fissures.  Pedal pulses: intact bilaterally  Nails thickened, discolored and mycotic appearing   Thick callouses bilateral heels    Neurological:      General: No focal deficit present.      Mental Status: She is alert and oriented to person, place, and time.   Psychiatric:         Mood and Affect: Mood normal.         Behavior: Behavior normal.           Results  HbA1c Oct. 2024 - 6.7; today HbA1c - 7.0    Reviewed ABIs performed in 2020 - normal         - Labs:    - A1c: 7    Assessment & Plan:     1. Type 2 diabetes mellitus with other specified complication, " without long-term current use of insulin (HCC)  POCT Hemoglobin A1C    POCT Retinal Eye Exam    MICROALBUMIN CREAT RATIO URINE      2. Dyslipidemia due to type 2 diabetes mellitus (HCC)        3. Hypertension associated with type 2 diabetes mellitus (HCC)        4. Hypercoagulable state, secondary ([facility])        5. Factor V deficiency ([facility])  US-EXTREMITY VENOUS LOWER UNILAT RIGHT      6. Tobacco abuse        7. History of iron deficiency  CBC WITHOUT DIFFERENTIAL    IRON/TOTAL IRON BIND    FERRITIN      8. Right leg swelling  US-EXTREMITY VENOUS LOWER UNILAT RIGHT      9. History of DVT (deep vein thrombosis)  US-EXTREMITY VENOUS LOWER UNILAT RIGHT          Assessment & Plan  1. Type 2 diabetes mellitus: Stable. Hemoglobin A1c of 7.  - Increase physical activity.  - Point-of-care retinal scan performed.  - 10-year cardiovascular risk is 36%.  - Trial of Crestor 10 mg daily discussed and agreed upon.  - Potential side effects discussed including muscle aches and joint pains.    2. Chronic right lower leg swelling: History of extensive DVT and hypercoagulable state due to factor V deficiency.  - Chronic anticoagulation therapy with Xarelto.  - Suspected venous reflux disease leading to chronic edema.  - Ultrasound to rule out chronic or acute DVT.  - Consider alternative anticoagulation plan if DVT confirmed.    3. Longstanding tobacco abuse.  - Declined lung cancer screening with yearly chest CT.  - No recent formal PFTs or COPD diagnosis.  - Declined further evaluation.    4. History of iron deficiency.  - Received iron infusions last fall.  - Repeat labs ordered to assess response, including CBC and iron panel.    5. Chronic sinus congestion: Likely allergic rhinosinusitis.  - Generic OTC antihistamine with limited improvement.  - Astelin nasal spray was ineffective.  - Trial of Flonase discussed.  - Continue nasal saline spray.    Follow-up  - Scheduled for follow-up visit in 3 to 4 weeks.      I spent a total of 45  minutes with record review, exam, communication with the patient, communication with other providers, and documentation of this encounter.    Return in about 3 weeks (around 7/2/2025) for Med check and f/u leg .    Please note that this dictation was created using voice recognition software. I have made every reasonable attempt to correct obvious errors, but I expect that there are errors of grammar and possibly content that I did not discover before finalizing the note.

## 2025-06-11 NOTE — PATIENT INSTRUCTIONS
- Call to schedule ultrasound of right leg. I suspect you have venous reflux, or leaky veins leading to chronic swelling    - a compression stocking can be helpful    - Try the Flonase nasal spray to help with chronic congestion    - Start the Crestor for cholesterol, and please let me know if you notice any significant change or worsening of any  muscle pains or joint pains

## 2025-06-12 ENCOUNTER — RESULTS FOLLOW-UP (OUTPATIENT)
Dept: MEDICAL GROUP | Facility: PHYSICIAN GROUP | Age: 75
End: 2025-06-12

## 2025-06-12 LAB
CREAT UR-MCNC: 47.1 MG/DL
MICROALBUMIN UR-MCNC: <1.2 MG/DL
MICROALBUMIN/CREAT UR: NORMAL MG/G (ref 0–30)

## 2025-06-26 ENCOUNTER — HOSPITAL ENCOUNTER (OUTPATIENT)
Dept: RADIOLOGY | Facility: MEDICAL CENTER | Age: 75
End: 2025-06-26
Attending: FAMILY MEDICINE
Payer: MEDICARE

## 2025-06-26 DIAGNOSIS — M79.89 RIGHT LEG SWELLING: ICD-10-CM

## 2025-06-26 DIAGNOSIS — Z86.718 HISTORY OF DVT (DEEP VEIN THROMBOSIS): ICD-10-CM

## 2025-06-26 DIAGNOSIS — D68.2 FACTOR V DEFICIENCY (HCC): ICD-10-CM

## 2025-06-26 PROCEDURE — 93971 EXTREMITY STUDY: CPT | Mod: RT

## 2025-07-03 ENCOUNTER — OFFICE VISIT (OUTPATIENT)
Dept: MEDICAL GROUP | Facility: PHYSICIAN GROUP | Age: 75
End: 2025-07-03
Payer: MEDICARE

## 2025-07-03 VITALS
HEIGHT: 63 IN | SYSTOLIC BLOOD PRESSURE: 110 MMHG | TEMPERATURE: 97.1 F | DIASTOLIC BLOOD PRESSURE: 66 MMHG | RESPIRATION RATE: 92 BRPM | HEART RATE: 111 BPM | BODY MASS INDEX: 39.87 KG/M2 | WEIGHT: 225 LBS

## 2025-07-03 DIAGNOSIS — R60.0 BILATERAL LOWER EXTREMITY EDEMA: ICD-10-CM

## 2025-07-03 DIAGNOSIS — M79.604 PAIN IN BOTH LOWER EXTREMITIES: ICD-10-CM

## 2025-07-03 DIAGNOSIS — I87.2 VENOUS REFLUX: ICD-10-CM

## 2025-07-03 DIAGNOSIS — Z86.718 HISTORY OF DVT (DEEP VEIN THROMBOSIS): Primary | ICD-10-CM

## 2025-07-03 DIAGNOSIS — M79.605 PAIN IN BOTH LOWER EXTREMITIES: ICD-10-CM

## 2025-07-03 PROCEDURE — 99213 OFFICE O/P EST LOW 20 MIN: CPT | Performed by: FAMILY MEDICINE

## 2025-07-03 PROCEDURE — 3078F DIAST BP <80 MM HG: CPT | Performed by: FAMILY MEDICINE

## 2025-07-03 PROCEDURE — 3074F SYST BP LT 130 MM HG: CPT | Performed by: FAMILY MEDICINE

## 2025-07-03 ASSESSMENT — FIBROSIS 4 INDEX: FIB4 SCORE: 1.09

## 2025-07-03 NOTE — PATIENT INSTRUCTIONS
[facility] - [address] [place] [address]  [phone]    Referral to [facility] is in place if you wish to pursue this

## 2025-07-03 NOTE — PROGRESS NOTES
"Verbal consent was acquired by the patient to use [employer] ambient listening note generation during this visit YES    Subjective:     HPI:   History of Present Illness  Chief Complaint   Patient presents with    Leg Pain     Right leg swelling not better     Patient with chronic lower extremity edema, right worse than left, here to follow-up venous ultrasound results.      Leg Pain and swelling  - She reports experiencing pain in her leg, which she describes as similar to a bruise.  - She has attempted to use compression stockings but found them too short and uncomfortable.  - She is seeking advice on the appropriate size of compression stockings to purchase.   Has some short ones that are below knee but trying to order longer, above knee compression stockings on [employer].       FINDINGS   Right lower extremity-   The popliteal vein appears to be recanalized with fibrous residua of prior    thrombosis.    All other veins demonstrate complete color filling and compressibility with    normal venous flow dynamics including spontaneous flow and respiratory    phasicity.    No superficial or acute deep venous thrombosis.    Significant deep venous reflux (1416 ms) in the mid femoral vein.   Significant deep venous reflux ( 2076 ms) in the popliteal vein.     The great saphenous vein is patent and demonstrates significant reflux from    the proximal calf to the ankle.    The great saphenous vein demonstrates significant reflux in 3 out of the 8    evaluated segments.    The small saphenous vein is patent and demonstrates significant reflux from    the proximal calf to the mid calf.   The small saphenous vein supplies an incompetent varicosity/tributary at    the mid-distal calf.          Objective:     Exam:  /66 (BP Location: Left arm, Patient Position: Sitting, BP Cuff Size: Adult)   Pulse (!) 111   Temp 36.2 °C (97.1 °F) (Temporal)   Resp (!) 92   Ht 1.61 m (5' 3.39\")   Wt 102 kg (225 lb)   BMI 39.37 kg/m² "  Body mass index is 39.37 kg/m².    Physical Exam  Vitals and nursing note reviewed.   Constitutional:       Appearance: Normal appearance.   HENT:      Head: Normocephalic and atraumatic.   Cardiovascular:      Rate and Rhythm: Normal rate.   Pulmonary:      Effort: Pulmonary effort is normal.   Musculoskeletal:      Right lower leg: Edema (non-pitting, taught edema to just proximal to knee on right. no erythema or warmth) present.   Neurological:      Mental Status: She is alert. Mental status is at baseline.   Psychiatric:         Mood and Affect: Mood normal.             Results  - Imaging:      Assessment & Plan:     1. History of DVT (deep vein thrombosis)  Referral to Interventional Radiology      2. Pain in both lower extremities  Referral to Interventional Radiology      3. Venous reflux  Referral to Interventional Radiology      4. Bilateral lower extremity edema  Referral to Interventional Radiology          Assessment & Plan  1.  Bilateral lower extremity edema, severe venous reflux, bilateral leg pain right greater than left, and history of DVT.  Reassured patient that there is no evidence of a new DVT on ultrasound but findings that support severe venous reflux, which would explain the swelling and aching in her legs.  We discussed that above-knee compression stockings would be the first-line of management.  She was given information for the care chest to go in person to get assistance with compression stockings.  I also did initiate a referral to the [facility] to discuss further options for management of her severe reflux with resultant swelling and pain.            No follow-ups on file.    Please note that this dictation was created using voice recognition software. I have made every reasonable attempt to correct obvious errors, but I expect that there are errors of grammar and possibly content that I did not discover before finalizing the note.

## 2025-07-17 ENCOUNTER — APPOINTMENT (OUTPATIENT)
Dept: MEDICAL GROUP | Facility: PHYSICIAN GROUP | Age: 75
End: 2025-07-17
Payer: MEDICARE

## 2025-08-06 DIAGNOSIS — M79.605 PAIN IN BOTH LOWER EXTREMITIES: ICD-10-CM

## 2025-08-06 DIAGNOSIS — M79.604 PAIN IN BOTH LOWER EXTREMITIES: ICD-10-CM

## 2025-08-06 RX ORDER — GABAPENTIN 300 MG/1
CAPSULE ORAL
Qty: 180 CAPSULE | Refills: 3 | Status: SHIPPED | OUTPATIENT
Start: 2025-08-06